# Patient Record
Sex: FEMALE | Race: ASIAN | NOT HISPANIC OR LATINO | Employment: PART TIME | URBAN - METROPOLITAN AREA
[De-identification: names, ages, dates, MRNs, and addresses within clinical notes are randomized per-mention and may not be internally consistent; named-entity substitution may affect disease eponyms.]

---

## 2018-09-27 ENCOUNTER — OFFICE VISIT - HEALTHEAST (OUTPATIENT)
Dept: FAMILY MEDICINE | Facility: CLINIC | Age: 33
End: 2018-09-27

## 2018-09-27 ENCOUNTER — COMMUNICATION - HEALTHEAST (OUTPATIENT)
Dept: SCHEDULING | Facility: CLINIC | Age: 33
End: 2018-09-27

## 2018-09-27 ENCOUNTER — COMMUNICATION - HEALTHEAST (OUTPATIENT)
Dept: TELEHEALTH | Facility: CLINIC | Age: 33
End: 2018-09-27

## 2018-09-27 DIAGNOSIS — E11.9 DIABETES (H): ICD-10-CM

## 2018-09-27 DIAGNOSIS — M54.2 NECK PAIN ON RIGHT SIDE: ICD-10-CM

## 2018-09-27 LAB
BASOPHILS # BLD AUTO: 0.1 THOU/UL (ref 0–0.2)
BASOPHILS NFR BLD AUTO: 1 % (ref 0–2)
C REACTIVE PROTEIN LHE: 0.9 MG/DL (ref 0–0.8)
EOSINOPHIL # BLD AUTO: 0.2 THOU/UL (ref 0–0.4)
EOSINOPHIL NFR BLD AUTO: 2 % (ref 0–6)
ERYTHROCYTE [DISTWIDTH] IN BLOOD BY AUTOMATED COUNT: 12.4 % (ref 11–14.5)
ERYTHROCYTE [SEDIMENTATION RATE] IN BLOOD BY WESTERGREN METHOD: 35 MM/HR (ref 0–20)
HBA1C MFR BLD: 7.7 % (ref 3.5–6)
HCT VFR BLD AUTO: 40.7 % (ref 35–47)
HGB BLD-MCNC: 14 G/DL (ref 12–16)
LYMPHOCYTES # BLD AUTO: 2.9 THOU/UL (ref 0.8–4.4)
LYMPHOCYTES NFR BLD AUTO: 37 % (ref 20–40)
MCH RBC QN AUTO: 29.4 PG (ref 27–34)
MCHC RBC AUTO-ENTMCNC: 34.3 G/DL (ref 32–36)
MCV RBC AUTO: 86 FL (ref 80–100)
MONOCYTES # BLD AUTO: 0.7 THOU/UL (ref 0–0.9)
MONOCYTES NFR BLD AUTO: 9 % (ref 2–10)
NEUTROPHILS # BLD AUTO: 3.9 THOU/UL (ref 2–7.7)
NEUTROPHILS NFR BLD AUTO: 51 % (ref 50–70)
PLATELET # BLD AUTO: 344 THOU/UL (ref 140–440)
PMV BLD AUTO: 6.9 FL (ref 7–10)
RBC # BLD AUTO: 4.74 MILL/UL (ref 3.8–5.4)
WBC: 7.8 THOU/UL (ref 4–11)

## 2018-10-03 ENCOUNTER — OFFICE VISIT - HEALTHEAST (OUTPATIENT)
Dept: OTOLARYNGOLOGY | Facility: CLINIC | Age: 33
End: 2018-10-03

## 2018-10-03 DIAGNOSIS — Z53.21 PROCEDURE AND TREATMENT NOT CARRIED OUT DUE TO PATIENT LEAVING PRIOR TO BEING SEEN BY HEALTH CARE PROVIDER: ICD-10-CM

## 2020-01-30 ENCOUNTER — OFFICE VISIT - HEALTHEAST (OUTPATIENT)
Dept: FAMILY MEDICINE | Facility: CLINIC | Age: 35
End: 2020-01-30

## 2020-01-30 DIAGNOSIS — J10.1 INFLUENZA B: ICD-10-CM

## 2020-01-30 DIAGNOSIS — J02.9 SORE THROAT: ICD-10-CM

## 2020-01-30 DIAGNOSIS — E11.9 TYPE 2 DIABETES MELLITUS WITHOUT COMPLICATION, WITHOUT LONG-TERM CURRENT USE OF INSULIN (H): ICD-10-CM

## 2020-01-30 LAB
DEPRECATED S PYO AG THROAT QL EIA: NORMAL
FLUAV AG SPEC QL IA: ABNORMAL
FLUBV AG SPEC QL IA: ABNORMAL

## 2020-01-31 LAB — GROUP A STREP BY PCR: NORMAL

## 2021-01-29 ENCOUNTER — COMMUNICATION - HEALTHEAST (OUTPATIENT)
Dept: FAMILY MEDICINE | Facility: CLINIC | Age: 36
End: 2021-01-29

## 2021-02-01 ENCOUNTER — AMBULATORY - HEALTHEAST (OUTPATIENT)
Dept: FAMILY MEDICINE | Facility: CLINIC | Age: 36
End: 2021-02-01

## 2021-02-01 ENCOUNTER — HOSPITAL ENCOUNTER (OUTPATIENT)
Dept: ULTRASOUND IMAGING | Facility: CLINIC | Age: 36
Discharge: HOME | End: 2021-02-01
Attending: NURSE PRACTITIONER

## 2021-02-01 ENCOUNTER — OFFICE VISIT - HEALTHEAST (OUTPATIENT)
Dept: FAMILY MEDICINE | Facility: CLINIC | Age: 36
End: 2021-02-01

## 2021-02-01 ENCOUNTER — COMMUNICATION - HEALTHEAST (OUTPATIENT)
Dept: TELEHEALTH | Facility: CLINIC | Age: 36
End: 2021-02-01

## 2021-02-01 DIAGNOSIS — R10.2 PELVIC PAIN IN FEMALE: ICD-10-CM

## 2021-02-01 DIAGNOSIS — N91.2 AMENORRHEA: ICD-10-CM

## 2021-02-01 DIAGNOSIS — M54.50 LUMBAR PAIN: ICD-10-CM

## 2021-02-01 DIAGNOSIS — Z32.01 PREGNANCY TEST POSITIVE: ICD-10-CM

## 2021-02-01 DIAGNOSIS — E11.65 TYPE 2 DIABETES MELLITUS WITH HYPERGLYCEMIA, WITHOUT LONG-TERM CURRENT USE OF INSULIN (H): ICD-10-CM

## 2021-02-01 LAB
ALBUMIN SERPL-MCNC: 3.7 G/DL (ref 3.5–5)
ALBUMIN UR-MCNC: NEGATIVE MG/DL
ALP SERPL-CCNC: 52 U/L (ref 45–120)
ALT SERPL W P-5'-P-CCNC: 23 U/L (ref 0–45)
ANION GAP SERPL CALCULATED.3IONS-SCNC: 10 MMOL/L (ref 5–18)
APPEARANCE UR: CLEAR
AST SERPL W P-5'-P-CCNC: 13 U/L (ref 0–40)
BILIRUB SERPL-MCNC: 0.3 MG/DL (ref 0–1)
BILIRUB UR QL STRIP: NEGATIVE
BUN SERPL-MCNC: 8 MG/DL (ref 8–22)
CALCIUM SERPL-MCNC: 8.9 MG/DL (ref 8.5–10.5)
CHLORIDE BLD-SCNC: 104 MMOL/L (ref 98–107)
CO2 SERPL-SCNC: 22 MMOL/L (ref 22–31)
COLOR UR AUTO: YELLOW
CREAT SERPL-MCNC: 0.62 MG/DL (ref 0.6–1.1)
CREAT UR-MCNC: 16.9 MG/DL
GFR SERPL CREATININE-BSD FRML MDRD: >60 ML/MIN/1.73M2
GLUCOSE BLD-MCNC: 123 MG/DL (ref 70–125)
GLUCOSE UR STRIP-MCNC: ABNORMAL MG/DL
HBA1C MFR BLD: 7.7 %
HCG UR QL: POSITIVE
HGB UR QL STRIP: NEGATIVE
KETONES UR STRIP-MCNC: NEGATIVE MG/DL
LEUKOCYTE ESTERASE UR QL STRIP: NEGATIVE
MICROALBUMIN UR-MCNC: <0.5 MG/DL (ref 0–1.99)
MICROALBUMIN/CREAT UR: NORMAL MG/G{CREAT}
NITRATE UR QL: NEGATIVE
PH UR STRIP: 6.5 [PH] (ref 5–8)
POTASSIUM BLD-SCNC: 3.9 MMOL/L (ref 3.5–5)
PROT SERPL-MCNC: 7.1 G/DL (ref 6–8)
SODIUM SERPL-SCNC: 136 MMOL/L (ref 136–145)
SP GR UR STRIP: 1.01 (ref 1–1.03)
UROBILINOGEN UR STRIP-ACNC: ABNORMAL

## 2021-02-01 ASSESSMENT — MIFFLIN-ST. JEOR: SCORE: 1303.68

## 2021-02-02 ENCOUNTER — AMBULATORY - HEALTHEAST (OUTPATIENT)
Dept: FAMILY MEDICINE | Facility: CLINIC | Age: 36
End: 2021-02-02

## 2021-02-02 ENCOUNTER — COMMUNICATION - HEALTHEAST (OUTPATIENT)
Dept: FAMILY MEDICINE | Facility: CLINIC | Age: 36
End: 2021-02-02

## 2021-02-02 DIAGNOSIS — N91.2 AMENORRHEA: ICD-10-CM

## 2021-02-02 DIAGNOSIS — Z32.01 PREGNANCY TEST POSITIVE: ICD-10-CM

## 2021-02-17 ENCOUNTER — HOSPITAL ENCOUNTER (OUTPATIENT)
Dept: ULTRASOUND IMAGING | Facility: CLINIC | Age: 36
Discharge: HOME | End: 2021-02-17
Attending: NURSE PRACTITIONER

## 2021-02-17 DIAGNOSIS — Z32.01 PREGNANCY TEST POSITIVE: ICD-10-CM

## 2021-02-17 DIAGNOSIS — N91.2 AMENORRHEA: ICD-10-CM

## 2021-02-18 ENCOUNTER — COMMUNICATION - HEALTHEAST (OUTPATIENT)
Dept: FAMILY MEDICINE | Facility: CLINIC | Age: 36
End: 2021-02-18

## 2021-03-08 ENCOUNTER — PRENATAL OFFICE VISIT - HEALTHEAST (OUTPATIENT)
Dept: FAMILY MEDICINE | Facility: CLINIC | Age: 36
End: 2021-03-08

## 2021-03-08 ENCOUNTER — RECORDS - HEALTHEAST (OUTPATIENT)
Dept: ADMINISTRATIVE | Facility: OTHER | Age: 36
End: 2021-03-08

## 2021-03-08 DIAGNOSIS — E08.00 DIABETES MELLITUS DUE TO UNDERLYING CONDITION WITH HYPEROSMOLARITY WITHOUT COMA, WITH LONG-TERM CURRENT USE OF INSULIN (H): ICD-10-CM

## 2021-03-08 DIAGNOSIS — E11.65 TYPE 2 DIABETES MELLITUS WITH HYPERGLYCEMIA, WITHOUT LONG-TERM CURRENT USE OF INSULIN (H): ICD-10-CM

## 2021-03-08 DIAGNOSIS — Z79.4 DIABETES MELLITUS DUE TO UNDERLYING CONDITION WITH HYPEROSMOLARITY WITHOUT COMA, WITH LONG-TERM CURRENT USE OF INSULIN (H): ICD-10-CM

## 2021-03-08 DIAGNOSIS — Z34.90 PREGNANCY, UNSPECIFIED GESTATIONAL AGE: ICD-10-CM

## 2021-03-08 LAB
BASOPHILS # BLD AUTO: 0.1 THOU/UL (ref 0–0.2)
BASOPHILS NFR BLD AUTO: 1 % (ref 0–2)
EOSINOPHIL # BLD AUTO: 0.1 THOU/UL (ref 0–0.4)
EOSINOPHIL NFR BLD AUTO: 1 % (ref 0–6)
ERYTHROCYTE [DISTWIDTH] IN BLOOD BY AUTOMATED COUNT: 12.2 % (ref 11–14.5)
HCT VFR BLD AUTO: 38 % (ref 35–47)
HGB BLD-MCNC: 12.6 G/DL (ref 12–16)
HIV 1+2 AB+HIV1 P24 AG SERPL QL IA: NEGATIVE
IMM GRANULOCYTES # BLD: 0 THOU/UL
IMM GRANULOCYTES NFR BLD: 0 %
LYMPHOCYTES # BLD AUTO: 2.9 THOU/UL (ref 0.8–4.4)
LYMPHOCYTES NFR BLD AUTO: 34 % (ref 20–40)
MCH RBC QN AUTO: 29.2 PG (ref 27–34)
MCHC RBC AUTO-ENTMCNC: 33.2 G/DL (ref 32–36)
MCV RBC AUTO: 88 FL (ref 80–100)
MONOCYTES # BLD AUTO: 0.8 THOU/UL (ref 0–0.9)
MONOCYTES NFR BLD AUTO: 10 % (ref 2–10)
NEUTROPHILS # BLD AUTO: 4.7 THOU/UL (ref 2–7.7)
NEUTROPHILS NFR BLD AUTO: 55 % (ref 50–70)
PLATELET # BLD AUTO: 314 THOU/UL (ref 140–440)
PMV BLD AUTO: 9.7 FL (ref 8.5–12.5)
RBC # BLD AUTO: 4.31 MILL/UL (ref 3.8–5.4)
TSH SERPL DL<=0.005 MIU/L-ACNC: 2.12 UIU/ML (ref 0.3–5)
WBC: 8.6 THOU/UL (ref 4–11)

## 2021-03-08 RX ORDER — INSULIN GLARGINE 100 [IU]/ML
10 INJECTION, SOLUTION SUBCUTANEOUS DAILY
Status: SHIPPED | COMMUNITY
Start: 2021-02-11 | End: 2021-08-05

## 2021-03-08 ASSESSMENT — MIFFLIN-ST. JEOR: SCORE: 1287.52

## 2021-03-09 LAB
ABO/RH(D): NORMAL
ABORH REPEAT: NORMAL
ANTIBODY SCREEN: NEGATIVE
C TRACH DNA SPEC QL PROBE+SIG AMP: NEGATIVE
HBV SURFACE AG SERPL QL IA: NEGATIVE
HPV SOURCE: NORMAL
HUMAN PAPILLOMA VIRUS 16 DNA: NEGATIVE
HUMAN PAPILLOMA VIRUS 18 DNA: NEGATIVE
HUMAN PAPILLOMA VIRUS FINAL DIAGNOSIS: NORMAL
HUMAN PAPILLOMA VIRUS OTHER HR: NEGATIVE
N GONORRHOEA DNA SPEC QL NAA+PROBE: NEGATIVE
RUBV IGG SERPL QL IA: POSITIVE
SPECIMEN DESCRIPTION: NORMAL
T PALLIDUM AB SER QL: NEGATIVE

## 2021-03-15 ENCOUNTER — COMMUNICATION - HEALTHEAST (OUTPATIENT)
Dept: FAMILY MEDICINE | Facility: CLINIC | Age: 36
End: 2021-03-15

## 2021-03-15 LAB
BKR LAB AP ABNORMAL BLEEDING: NO
BKR LAB AP BIRTH CONTROL/HORMONES: NORMAL
BKR LAB AP CERVICAL APPEARANCE: NORMAL
BKR LAB AP GYN ADEQUACY: NORMAL
BKR LAB AP GYN INTERPRETATION: NORMAL
BKR LAB AP HPV REFLEX: NORMAL
BKR LAB AP LMP: NORMAL
BKR LAB AP PATIENT STATUS: NORMAL
BKR LAB AP PREVIOUS ABNORMAL: NO
BKR LAB AP PREVIOUS NORMAL: 2011
HIGH RISK?: NO
PATH REPORT.COMMENTS IMP SPEC: NORMAL
RESULT FLAG (HE HISTORICAL CONVERSION): NORMAL

## 2021-05-04 ENCOUNTER — PRENATAL OFFICE VISIT - HEALTHEAST (OUTPATIENT)
Dept: FAMILY MEDICINE | Facility: CLINIC | Age: 36
End: 2021-05-04

## 2021-05-04 DIAGNOSIS — Z34.90 PREGNANCY, UNSPECIFIED GESTATIONAL AGE: ICD-10-CM

## 2021-05-04 DIAGNOSIS — E11.65 TYPE 2 DIABETES MELLITUS WITH HYPERGLYCEMIA, WITHOUT LONG-TERM CURRENT USE OF INSULIN (H): ICD-10-CM

## 2021-05-10 ENCOUNTER — HOSPITAL ENCOUNTER (OUTPATIENT)
Dept: ULTRASOUND IMAGING | Facility: CLINIC | Age: 36
Discharge: HOME | End: 2021-05-10
Attending: FAMILY MEDICINE

## 2021-05-14 ENCOUNTER — COMMUNICATION - HEALTHEAST (OUTPATIENT)
Dept: FAMILY MEDICINE | Facility: CLINIC | Age: 36
End: 2021-05-14

## 2021-05-27 VITALS
WEIGHT: 143.6 LBS | OXYGEN SATURATION: 98 % | DIASTOLIC BLOOD PRESSURE: 66 MMHG | SYSTOLIC BLOOD PRESSURE: 100 MMHG | HEART RATE: 84 BPM

## 2021-06-01 ENCOUNTER — RECORDS - HEALTHEAST (OUTPATIENT)
Dept: ADMINISTRATIVE | Facility: CLINIC | Age: 36
End: 2021-06-01

## 2021-06-02 VITALS — BODY MASS INDEX: 32.42 KG/M2 | WEIGHT: 166 LBS

## 2021-06-04 VITALS
HEART RATE: 88 BPM | BODY MASS INDEX: 32.03 KG/M2 | SYSTOLIC BLOOD PRESSURE: 121 MMHG | WEIGHT: 164 LBS | TEMPERATURE: 98.8 F | OXYGEN SATURATION: 98 % | DIASTOLIC BLOOD PRESSURE: 85 MMHG

## 2021-06-05 VITALS
BODY MASS INDEX: 30.18 KG/M2 | WEIGHT: 153.7 LBS | HEIGHT: 60 IN | DIASTOLIC BLOOD PRESSURE: 60 MMHG | HEART RATE: 76 BPM | SYSTOLIC BLOOD PRESSURE: 100 MMHG

## 2021-06-05 VITALS
SYSTOLIC BLOOD PRESSURE: 96 MMHG | WEIGHT: 150 LBS | DIASTOLIC BLOOD PRESSURE: 60 MMHG | OXYGEN SATURATION: 97 % | HEIGHT: 60 IN | HEART RATE: 84 BPM | BODY MASS INDEX: 29.45 KG/M2

## 2021-06-05 NOTE — PROGRESS NOTES
Walk In Care Note                                                        Date of Visit: 1/30/2020     Chief Complaint   Selena Lui is a(n) 34 y.o.  female who presents to Walk In Care with the following complaint(s):  Sore Throat (x 3 days)       Assessment and Plan   1. Influenza B  - oseltamivir (TAMIFLU) 75 MG capsule; Take 1 capsule (75 mg total) by mouth 2 (two) times a day for 5 days.  Dispense: 10 capsule; Refill: 0    2. Sore throat  - Rapid Strep A Screen-Throat swab  - Group A Strep, RNA Direct Detection, Throat  - Influenza A/B Rapid Test- Nasal Swab    3. Type 2 diabetes mellitus without complication, without long-term current use of insulin (H)      Treating influenza with oseltamivir as listed above since patient is considered high risk for influenza-related complications due to her diabetes. Discussed potential benefits and side effects of this medication with the patient. Discussed symptomatic / supportive cares, including rest, hydration, and use of alternating doses of acetaminophen and ibuprofen to manage fever and discomfort. Strep screen is negative. Reflex strep testing is in process; will prescribe amoxicillin if positive.     Patient does have underlying type 2 diabetes mellitus, currently untreated.  She reports that she has not taken metformin for over a year.  Last recorded hemoglobin A1c was 7.7% on 9/27/2018.    Counseled patient regarding assessment and plan for evaluation and treatment. Questions were answered. See AVS for the specific written instructions and educational handout(s) regarding influenza that were provided at the conclusion of the visit.     Discussed signs / symptoms that warrant urgent / emergent medical attention.     Follow up within 4 days if symptoms do not improve.  Instructed patient to reestablish with Primary Care at her earliest convenience for management of her type 2 diabetes.     History of Present Illness   Primary symptom: Sore throat  Onset: 2 days  ago  Progression: Persisting  Hoarseness: Yes  Dysphagia: Yes  Fevers: Initially, Tmax 103 F  Chills: Yes  Upper respiratory symptoms: Has nasal congestion, rhinorrhea, and mild cough starting today.   Associated headache: Yes  Associated rash: No  Associated abdominal pain: No abdominal pain. Vomited once last night.   Additional symptoms: Lightheaded  Home therapies utilized: Tylenol Cold & Flu  History of recurrent strep: No  Exposure to strep: No     Review of Systems   Review of Systems   All other systems reviewed and are negative.       Physical Exam   Vitals:    01/30/20 0939   BP: 121/85   Patient Site: Right Arm   Patient Position: Sitting   Cuff Size: Adult Regular   Pulse: 88   Temp: 98.8  F (37.1  C)   TempSrc: Oral   SpO2: 98%   Weight: 164 lb (74.4 kg)     Physical Exam  Vitals signs and nursing note reviewed.   Constitutional:       General: She is not in acute distress.     Appearance: She is well-developed and normal weight. She is not ill-appearing or toxic-appearing.   HENT:      Head: Normocephalic and atraumatic.      Right Ear: Tympanic membrane, ear canal and external ear normal.      Left Ear: Tympanic membrane, ear canal and external ear normal.      Nose: Mucosal edema present. No rhinorrhea.      Mouth/Throat:      Mouth: Mucous membranes are moist. No oral lesions.      Pharynx: Uvula midline. Posterior oropharyngeal erythema present. No oropharyngeal exudate.      Tonsils: No tonsillar exudate. 1+ on the right. 1+ on the left.   Eyes:      General: Lids are normal.      Conjunctiva/sclera: Conjunctivae normal.   Neck:      Musculoskeletal: Neck supple. No edema or erythema.   Cardiovascular:      Rate and Rhythm: Normal rate and regular rhythm.      Heart sounds: S1 normal and S2 normal. No murmur. No friction rub. No gallop.    Pulmonary:      Effort: Pulmonary effort is normal.      Breath sounds: Normal breath sounds. No stridor. No wheezing, rhonchi or rales.   Lymphadenopathy:       Cervical: No cervical adenopathy.   Skin:     General: Skin is warm and dry.      Coloration: Skin is not pale.      Findings: No rash.   Neurological:      General: No focal deficit present.      Mental Status: She is alert and oriented to person, place, and time.          Diagnostic Studies   Laboratory:  Results for orders placed or performed in visit on 01/30/20   Rapid Strep A Screen-Throat swab   Result Value Ref Range    Rapid Strep A Antigen No Group A Strep detected, presumptive negative No Group A Strep detected, presumptive negative   Influenza A/B Rapid Test- Nasal Swab   Result Value Ref Range    Influenza  A, Rapid Antigen No Influenza A antigen detected No Influenza A antigen detected    Influenza B, Rapid Antigen Influenza B antigen detected (!) No Influenza B antigen detected     Radiology:  N/A  Electrocardiogram:  N/A     Procedure Note   N/A     Pertinent History   The following portions of the patient's history were reviewed and updated as appropriate: allergies, current medications, past family history, past medical history, past social history, past surgical history and problem list.    Patient has Tears of meniscus and ACL of left knee and Diabetes (H) on their problem list.    Patient has a past medical history of Diabetes (H) (10/18/2016) and Tears of meniscus and ACL of left knee (10/18/2016).    Patient has a past surgical history that includes Anterior cruciate ligament repair (Left).    Patient's family history includes No Medical Problems in her father and mother.    Patient reports that she has never smoked. She has never used smokeless tobacco. She reports that she does not drink alcohol or use drugs.     Portions of this note have been dictated using voice recognition software. Any grammatical or contextual distortions are unintentional and inherent to the software.    Titus Feliciano MD  [facility]

## 2021-06-07 ENCOUNTER — PRENATAL OFFICE VISIT - HEALTHEAST (OUTPATIENT)
Dept: FAMILY MEDICINE | Facility: CLINIC | Age: 36
End: 2021-06-07

## 2021-06-07 DIAGNOSIS — Z34.90 PREGNANCY, UNSPECIFIED GESTATIONAL AGE: ICD-10-CM

## 2021-06-09 ENCOUNTER — RECORDS - HEALTHEAST (OUTPATIENT)
Dept: ADMINISTRATIVE | Facility: OTHER | Age: 36
End: 2021-06-09

## 2021-06-09 LAB — HBA1C MFR BLD: 5.8 % (ref 4–6)

## 2021-06-14 ENCOUNTER — RECORDS - HEALTHEAST (OUTPATIENT)
Dept: HEALTH INFORMATION MANAGEMENT | Facility: CLINIC | Age: 36
End: 2021-06-14

## 2021-06-14 NOTE — TELEPHONE ENCOUNTER
Called and notified patient of the below message. Patient was transferred to radiology to arrange a 2 week follow-up US.

## 2021-06-14 NOTE — PROGRESS NOTES
Assessment and Plan:   1. Amenorrhea  Pregnancy, Urine    US OB < 14 Weeks With Transvaginal    CANCELED: US OB < 14 Weeks With Transvaginal   2. Pregnancy test positive  Ambulatory referral to Endocrinology    US OB < 14 Weeks With Transvaginal    CANCELED: US OB < 14 Weeks With Transvaginal   3. Pelvic pain in female  Urinalysis-UC if Indicated    US OB < 14 Weeks With Transvaginal   4. Lumbar pain  Urinalysis-UC if Indicated    US OB < 14 Weeks With Transvaginal   5. Type 2 diabetes mellitus with hyperglycemia, without long-term current use of insulin (H)  Glycosylated Hemoglobin A1c    Comprehensive Metabolic Panel    Microalbumin, Random Urine    Ambulatory referral to Endocrinology     According to her last menstrual period, patient is currently 6 weeks, 5 days gestation with an estimated due date of 9/23/2021.  Will obtain OB ultrasound to date the pregnancy and rule out an ectopic pregnancy due to a bout of pelvic pain and lumbar pain last week.  We will also rule out a urinary tract infection.  Patient will continue taking her prenatal vitamin with DHA.  Discussed avoidance of alcohol, smoking, drugs.  She is to avoid contact sports to the abdomen.  Provided OB handouts.  Patient plans on establishing OB care with Dr. Rivas.  Patient has a history of diabetes and has not been taking medication for this.  Will check A1c today.  Will refer to endocrinology for further evaluation and management during her pregnancy.  Patient is content with the plan.      Subjective:     Selena is a 35 y.o. female presenting to the clinic for a pregnancy confirmation.  Patient's last menstrual period was on 12/16/2020.  She has been traditionally  for 15 years.  She has 2 children ages 8 and 15.  They were born vaginally without complications.  Patient states this was not a planned pregnancy.  She had a positive at home pregnancy test 2 weeks ago.  She has had nausea, vomiting, breast tenderness.  She is taking a  prenatal multivitamin with DHA.  Patient states last week she experienced pain within her right lower lumbar and pelvic regions after urinating.  Pain lasted for 20 minutes and was a constant ache.  She has not had any vaginal bleeding or spotting.  Patient has type 2 diabetes and is not currently taking medication.  Patient was diagnosed after her second pregnancy 8 years ago.  She does not check her blood sugars.  Last A1c was 7.7% on 9/27/2018.    Review of Systems: A complete 14 point review of systems was obtained and is negative or as stated in the history of present illness.    Social History     Socioeconomic History     Marital status: Single     Spouse name: Not on file     Number of children: Not on file     Years of education: Not on file     Highest education level: Not on file   Occupational History     Not on file   Social Needs     Financial resource strain: Not on file     Food insecurity     Worry: Not on file     Inability: Not on file     Transportation needs     Medical: Not on file     Non-medical: Not on file   Tobacco Use     Smoking status: Never Smoker     Smokeless tobacco: Never Used   Substance and Sexual Activity     Alcohol use: No     Drug use: No     Sexual activity: Yes     Birth control/protection: I.U.D.     Comment: paragard   Lifestyle     Physical activity     Days per week: Not on file     Minutes per session: Not on file     Stress: Not on file   Relationships     Social connections     Talks on phone: Not on file     Gets together: Not on file     Attends [employer] service: Not on file     Active member of club or organization: Not on file     Attends meetings of clubs or organizations: Not on file     Relationship status: Not on file     Intimate partner violence     Fear of current or ex partner: Not on file     Emotionally abused: Not on file     Physically abused: Not on file     Forced sexual activity: Not on file   Other Topics Concern     Not on file   Social History  Narrative     Not on file       Active Ambulatory Problems     Diagnosis Date Noted     Tears of meniscus and ACL of left knee 10/18/2016     Diabetes (H) 10/18/2016     Resolved Ambulatory Problems     Diagnosis Date Noted     Ankle Joint Pain      No Additional Past Medical History       Family History   Problem Relation Age of Onset     No Medical Problems Mother      No Medical Problems Father        Objective:     /60 (Patient Site: Left Arm, Patient Position: Sitting, Cuff Size: Adult Regular)   Pulse 76   Ht 5' (1.524 m)   Wt 153 lb 11.2 oz (69.7 kg)   BMI 30.02 kg/m      Patient is alert, in no obvious distress.   Skin: Warm, dry.    Lungs:  Clear to auscultation. Respirations even and unlabored.  No wheezing or rales noted.   Heart:  Regular rate and rhythm.  No murmurs, S3, S4, gallops, or rubs.    Abdomen: Soft, nontender.  No organomegaly. Bowel sounds normoactive. No guarding or masses noted.      Urine pregnancy test ordered and is positive.

## 2021-06-14 NOTE — TELEPHONE ENCOUNTER
New Appointment Needed  What is the reason for the visit:    Pregnancy Confirmation Appt Needed  When was the first day of your last menstrual cycle?: 12/16/20  Have you done a home pregnancy test?: Yes: yes about 6wks pregnant.    Provider Preference: Any available  How soon do you need to be seen?: tomorrow  Waitlist offered?: No  Okay to leave a detailed message:  No

## 2021-06-14 NOTE — TELEPHONE ENCOUNTER
Yes, she is okay to see the [facility] [place].  Secondly, please notify her that her ultrasound showed her pregnancy at 5 weeks, 6 days gestation with an estimated due date of 9/28/21.  I would like to repeat the ultrasound in 2 weeks due to her being so early in the pregnancy.  I will place the order.  Thanks.

## 2021-06-14 NOTE — TELEPHONE ENCOUNTER
Pt would like this message transferred to the [facility]     MARIA LUZ Granados  1:48 PM ........ 1/29/2021

## 2021-06-14 NOTE — TELEPHONE ENCOUNTER
----- Message from Flora Ramirez CNP sent at 2/1/2021  9:00 PM CST -----  Please notify the patient that her A1C is elevated.  I spoke with Dr. Rivas who would like to start Metformin 500 mg twice daily.  I also placed a referral to diabetes education and Endocrinology to discuss dietary changes and medication management.  Thanks.

## 2021-06-14 NOTE — TELEPHONE ENCOUNTER
Left message to call back for: pt  Information to relay to patient:  Left message to call and schedule preg confirmation with Flora in Monday.

## 2021-06-14 NOTE — TELEPHONE ENCOUNTER
Reason for Call:  Call back     Detailed comments: recvd call from pt/Selena, you recommended her to meet with Endo and Diabetes Ed.    Selena is scheduled to see [facility] [place] this coming Thursday, 02.04.2021    F Diabetes Ed called Selena and stated that since she is scheduled with [facility] [place] they would not schedule her with [facility].    Selena would like to know if you are ok with her just seeing [facility] [place] [facility] right now and go from there once she meets with them this week.    Phone Number Patient can be reached at: Home number on file [phone] (home)    Best Time: anytime    Can we leave a detailed message on this number?: Yes    Call taken on 2/2/2021 at 10:32 AM by Danay Richards

## 2021-06-15 NOTE — TELEPHONE ENCOUNTER
Reason for Call:  Request for results:    Name of test or procedure: Blood Test for Gender of baby    Date of test of procedure: 03/08/21    Location of the test or procedure: [facility] to leave the result message on voice mail or with a family member? Yes    Phone number Patient can be reached at:   Cell number on file:    Telephone Information:   Mobile [phone]       Additional comments: Wanting to know the results of the Blood test.     Call taken on 3/15/2021 at 9:54 AM by Laverne Hassan

## 2021-06-15 NOTE — TELEPHONE ENCOUNTER
Reason contacted:  Results   Information relayed:  Below message relayed to patient. First OB scheduled with Dr. Rivas.   Additional questions:  No  Further follow-up needed:  No  Okay to leave a detailed message:  No

## 2021-06-15 NOTE — TELEPHONE ENCOUNTER
----- Message from Flora Ramirez CNP sent at 2/18/2021  7:21 AM CST -----  Please notify the patient that her ultrasound shows that she is 7 weeks, 6 days gestation with an estimated due date of 9/28/21.  Please assist her with scheduling her first ob with Dr. Rivas.  Thanks.

## 2021-06-15 NOTE — TELEPHONE ENCOUNTER
Please let pt know those results are not back yet.  They will be back later this week or early next week. It can take up to 2 weeks to get results

## 2021-06-15 NOTE — PROGRESS NOTES
PRENATAL VISIT   FIRST OBSTETRICAL EXAM - OB    Assessment / Impression     36-year-old G3, P2 at 10 weeks and 6 days gestation with an EDC of nine 2821 x 6-week ultrasound.  She has type 2 diabetes mellitus and is seeing [facility] [place] in consultation.  Blood sugars have been mildly high and she will continue to work with them.  Basaglar insulin has been added as well as Humalog insulin to her regimen.  She elects progenitor testing today and is open to just being called with all of her results.  Plan follow-up at 16 weeks.  Normal first prenatal visit at 10w6d  Discussed orientation, general information, lifestyle, nutrition, exercise,warning signs, resources, lab testing, risk screening and discussed cystic fibrosis screening with patient.  Questions answered.    Plan:        Initial labs drawn.  Prenatal vitamins.  Problem list reviewed and updated.  Genetic screening test options discussed:  Patient elects Cell free DNA test  Role of ultrasound in pregnancy discussed; fetal survey: requested.  Follow up: No follow-ups on file.      Subjective:    Selena Lui is a 36 y.o.  here today for her First Obstetrical Exam.   OB History    Para Term  AB Living   3 2 2     1   SAB TAB Ectopic Multiple Live Births           1      # Outcome Date GA Lbr Kamar/2nd Weight Sex Delivery Anes PTL Lv   3 Current            2 Term 12 39w0d  7 lb 8 oz (3.402 kg) M Vag-Spont None N    1 Term 05 39w0d  6 lb 8 oz (2.948 kg) F Vag-Spont  N CECELIA       Expected Date of Delivery: 2021, by Ultrasound    Past Medical History:   Diagnosis Date     Diabetes (H) 10/18/2016     Tears of meniscus and ACL of left knee 10/18/2016     Past Surgical History:   Procedure Laterality Date     ANTERIOR CRUCIATE LIGAMENT REPAIR Left          Social History     Tobacco Use     Smoking status: Never Smoker     Smokeless tobacco: Never Used   Substance Use Topics     Alcohol use: No     Drug use: No  "    Current Outpatient Medications   Medication Sig Dispense Refill     metFORMIN (GLUCOPHAGE) 500 MG tablet Take 1 tablet (500 mg total) by mouth 2 (two) times a day with meals. 180 tablet 0     ACCU-CHEK GUIDE GLUCOSE METER Misc Use As Directed. as directed       ACCU-CHEK GUIDE TEST STRIPS strips USE TO TEST FOUR TIMES DAILY       ACCU-CHEK SOFTCLIX LANCETS lancets USE TO TEST FOUR TIMES DAILY       BD BONNIE 2ND GEN PEN NEEDLE 32 gauge x 5/32\" Ndle USE TO TEST FOUR TIMES DAILY       insulin lispro (HUMALOG KWIKPEN) 100 unit/mL pen INJECT 2 UNITS UNDER THE SKIN WITH MEALS       LANTUS SOLOSTAR U-100 INSULIN 100 unit/mL (3 mL) pen Inject 8 Units under the skin daily.       TRUEPLUS KETONE Strp USE ONE AS DIRECTED       No current facility-administered medications for this visit.      No Known Allergies          High Risk Behavior: Age is <18 or >35    Review of Systems  General:  Denies problem  Eyes: Denies problem  Ears/Nose/Throat: Denies problem  Cardiovascular: Denies problem  Respiratory:  Denies problem  Gastrointestinal:  Denies problem, Genitourinary: Denies problem  Musculoskeletal:  Denies problem  Skin: Denies problem  Neurologic: Denies problem  Psychiatric: Denies problem  Endocrine: Denies problem  Heme/Lymphatic: Denies problem   Allergic/Immunologic: Denies problem       Objective:   Objective    Vitals:    03/08/21 1300   BP: 96/60   Pulse: 84   SpO2: 97%   Weight: 150 lb (68 kg)   Height: 5' 0.04\" (1.525 m)     Physical Exam:  General Appearance: Alert, cooperative, no distress, appears stated age  Head: Normocephalic, without obvious abnormality, atraumatic  Eyes: PERRL, conjunctiva/corneas clear, EOM's intact  Ears: Normal TM's and external ear canals, both ears  Nose: Nares normal, septum midline,mucosa normal, no drainage  Throat: Lips, mucosa, and tongue normal; teeth and gums normal  Neck: Supple, symmetrical, trachea midline, no adenopathy;  thyroid: not enlarged, symmetric, no " tenderness/mass/nodules; no carotid bruit or JVD  Back: Symmetric, no curvature, ROM normal, no CVA tenderness  Lungs: Clear to auscultation bilaterally, respirations unlabored  Breasts: No breast masses, tenderness, asymmetry, or nipple discharge.  Heart: Regular rate and rhythm, S1 and S2 normal, no murmur, rub, or gallop, Abdomen: Soft, non-tender, bowel sounds active all four quadrants,  no masses, no organomegaly  Pelvic:Normally developed genitalia with no external lesions or eruptions. Vagina and cervix show no lesions, inflammation, discharge or tenderness. No cystocele, No rectocele. Uterus normal.  No adnexal mass or tenderness.    Extremities: Extremities normal, atraumatic, no cyanosis or edema  Skin: Skin color, texture, turgor normal, no rashes or lesions  Lymph nodes: Cervical, supraclavicular, and axillary nodes normal  Neurologic: Normal     Lab:   Results for orders placed or performed in visit on 02/01/21   Pregnancy, Urine   Result Value Ref Range    Pregnancy Test, Urine Positive (!) Negative   Glycosylated Hemoglobin A1c   Result Value Ref Range    Hemoglobin A1c 7.7 (H) <=5.6 %   Comprehensive Metabolic Panel   Result Value Ref Range    Sodium 136 136 - 145 mmol/L    Potassium 3.9 3.5 - 5.0 mmol/L    Chloride 104 98 - 107 mmol/L    CO2 22 22 - 31 mmol/L    Anion Gap, Calculation 10 5 - 18 mmol/L    Glucose 123 70 - 125 mg/dL    BUN 8 8 - 22 mg/dL    Creatinine 0.62 0.60 - 1.10 mg/dL    GFR MDRD Af Amer >60 >60 mL/min/1.73m2    GFR MDRD Non Af Amer >60 >60 mL/min/1.73m2    Bilirubin, Total 0.3 0.0 - 1.0 mg/dL    Calcium 8.9 8.5 - 10.5 mg/dL    Protein, Total 7.1 6.0 - 8.0 g/dL    Albumin 3.7 3.5 - 5.0 g/dL    Alkaline Phosphatase 52 45 - 120 U/L    AST 13 0 - 40 U/L    ALT 23 0 - 45 U/L   Microalbumin, Random Urine   Result Value Ref Range    Microalbumin, Random Urine <0.50 0.00 - 1.99 mg/dL    Creatinine, Urine 16.9 mg/dL    Microalbumin/Creatinine Ratio Random Urine     Urinalysis-UC if  Indicated   Result Value Ref Range    Color, UA Yellow Colorless, Yellow, Straw, Light Yellow    Clarity, UA Clear Clear    Glucose,  mg/dL (!) Negative    Bilirubin, UA Negative Negative    Ketones, UA Negative Negative    Specific Gravity, UA 1.010 1.005 - 1.030    Blood, UA Negative Negative    pH, UA 6.5 5.0 - 8.0    Protein, UA Negative Negative mg/dL    Urobilinogen, UA 0.2 E.U./dL 0.2 E.U./dL, 1.0 E.U./dL    Nitrite, UA Negative Negative    Leukocytes, UA Negative Negative

## 2021-06-15 NOTE — TELEPHONE ENCOUNTER
I called pt and we spoke. I relayed the message below from Dr. Rivas. Pt understands and we will keep her posted.

## 2021-06-15 NOTE — TELEPHONE ENCOUNTER
Left message to call back for: results   Information to relay to patient:  Below message.    Please help schedule a first OB with Dr. Rivas between 3/5/2021 and 3/19/2021

## 2021-06-17 NOTE — PROGRESS NOTES
36-year-old G3, P2 at 19 weeks gestation, a positive blood type.  Pregnancy is complicated by pre-existing type 2 diabetes.  She is continue on Metformin and following with the endocrine clinic of [place].  They have added Lantus and mealtime insulin.  She still having some occasional higher blood sugars.  She will work with them and adjusting her insulin levels.  She is having good fetal movement.  No cramping or bleeding.  We reviewed her normal progenity  Results.  She is referred for a fetal survey.  She has lost weight as opposed to gaining weight.  She attributes this to eating healthier now that she is pregnant.  We will assess fetal growth on this ultrasound.  Follow-up in 1 month.

## 2021-06-17 NOTE — TELEPHONE ENCOUNTER
----- Message from Edwige Rivas MD sent at 2021 12:31 PM CDT -----  The radiologist added a note to her ultrasound result which shows the cord is inserted toward the edge of the placenta call marginal cord insertion.  The is not a problem typically however can result in  delivery or growth restriction. We will be doing regular ultrasounds starting at 28 weeks to monitor this.

## 2021-06-18 NOTE — PATIENT INSTRUCTIONS - HE
Patient Instructions by Titus Feliciano MD at 1/30/2020  9:30 AM     Author: Titus Feliciano MD Service: -- Author Type: Physician    Filed: 1/30/2020 11:02 AM Encounter Date: 1/30/2020 Status: Addendum    : Titus Feliciano MD (Physician)    Related Notes: Original Note by Titus Feliciano MD (Physician) filed at 1/30/2020 10:58 AM       -Rapid strep test is negative.  A confirmatory strep test is in process and will be finalized tomorrow.  -We will only reach out to you if the confirmatory strep test is positive.  An antibiotic will be prescribed if this test is positive.  -You have tested positive for Influenza B.  -Complete the full course of Tamiflu as directed.  -Recommend rest, hydration, and alternating doses of over the counter acetaminophen and ibuprofen as needed to manage fever and discomfort.  -Recommend that you reestablish with Primary Care at your earliest convenience to manage your diabetes.  Patient Education     Influenza (Adult)    Influenza is also called the flu. It is a viral illness that affects the air passages of your lungs. It is different from the common cold. The flu can easily be passed from one to person to another. It may be spread through the air by coughing and sneezing. Or it can be spread by touching the sick person and then touching your own eyes, nose, or mouth.  The flu starts 1 to 3 days after you are exposed to the flu virus. It may last for 1 to 2 weeks but many people feel tired or fatigued for many weeks afterward. You usually dont need to take antibiotics unless you have a complication. This might be an ear or sinus infection or pneumonia.  Symptoms of the flu may be mild or severe. They can include extreme tiredness (wanting to stay in bed all day), chills, fevers, muscle aches, soreness with eye movement, headache, and a dry, hacking cough.  Home care  Follow these guidelines when caring for yourself at home:    Avoid being around cigarette smoke,  whether yours or other peoples.    Acetaminophen or ibuprofen will help ease your fever, muscle aches, and headache. Dont give aspirin to anyone younger than 18 who has the flu. Aspirin can harm the liver.    Nausea and loss of appetite are common with the flu. Eat light meals. Drink 6 to 8 glasses of liquids every day. Good choices are water, sport drinks, soft drinks without caffeine, juices, tea, and soup. Extra fluids will also help loosen secretions in your nose and lungs.    Over-the-counter cold medicines will not make the flu go away faster. But the medicines may help with coughing, sore throat, and congestion in your nose and sinuses. Dont use a decongestant if you have high blood pressure.    Stay home until your fever has been gone for at least 24 hours without using medicine to reduce fever.  Follow-up care  Follow up with your healthcare provider, or as advised, if you are not getting better over the next week.  If you are age 65 or older, talk with your provider about getting a pneumococcal vaccine every 5 years. You should also get this vaccine if you have chronic asthma or COPD. All adults should get a flu vaccine every fall. Ask your provider about this.  When to seek medical advice  Call your healthcare provider right away if any of these occur:    Cough with lots of colored mucus (sputum) or blood in your mucus    Chest pain, shortness of breath, wheezing, or trouble breathing    Severe headache, or face, neck, or ear pain    New rash with fever    Fever of 100.4 F (38 C) or higher, or as directed by your healthcare provider    Confusion, behavior change, or seizure    Severe weakness or dizziness    You get a new fever or cough after getting better for a few days  Date Last Reviewed: 1/1/2017 2000-2017 [employer]. [address] [place] [address], [place] [address]. All rights reserved. This information is not intended as a substitute for professional medical care. Always follow your  healthcare professional's instructions.

## 2021-06-20 NOTE — LETTER
Letter by Titus Feliciano MD at      Author: Titus Feliciano MD Service: -- Author Type: --    Filed:  Encounter Date: 1/30/2020 Status: (Other)         January 30, 2020     Patient: Selena Lui   YOB: 1985   Date of Visit: 1/30/2020       To Whom it May Concern:    Selena Lui was seen in my clinic on 1/30/2020.  Please excuse her absence from work on 1/29/2020 and 1/30/2020 due to illness.    If you have any questions or concerns, please don't hesitate to call.    Sincerely,         Electronically signed by Titus Feliciano MD

## 2021-06-20 NOTE — PROGRESS NOTES
ASSESSMENT/PLAN:  1. Diabetes (H)  Type 2 diabetes, currently diet controlled.  Recheck a hemoglobin A1c today and it is elevated at 7.8.  I am going to recommend that she restart her metformin and follow-up in 3 months for recheck.  She is given a refill of her lancets and testing strips so she can more closely track her blood sugars.  - Glycosylated Hemoglobin A1c    2. Neck pain on right side  Primary reason for visit today with some pain in the right side of her neck.  Exam is unremarkable.  To evaluate for possible infection we will check a heme 1 sed rate and CRP.  I do not find an etiology for this pain and do refer to ENT for further recommendations.  - Ambulatory referral to ENT  - HM1(CBC and Differential)  - Sedimentation Rate  - C-Reactive Protein(CRP)  - HM1 (CBC with Diff)      Patient Instructions   Referral is placed for ENT, schedule this consult at your earliest convenience.       Orders Placed This Encounter   Procedures     Glycosylated Hemoglobin A1c     Sedimentation Rate     C-Reactive Protein(CRP)     HM1 (CBC with Diff)     Ambulatory referral to ENT     Referral Priority:   Routine     Referral Type:   Consultation     Referral Reason:   Evaluation and Treatment     Requested Specialty:   Otolaryngology     Number of Visits Requested:   1     Medications Discontinued During This Encounter   Medication Reason     blood glucose test (CONTOUR NEXT STRIPS) strips Therapy completed     metFORMIN (GLUCOPHAGE-XR) 750 MG 24 hr tablet Therapy completed     generic lancets (MICROLET LANCET) Therapy completed       Return if symptoms worsen or fail to improve.    CHIEF COMPLAINT;  Chief Complaint   Patient presents with     Neck Pain     sore area on right times 3 weeks       HISTORY OF PRESENT ILLNESS:  Selena is a 33 y.o. female presenting to the clinic today for evaluation of neck pain.    Neck Pain: She endorses a deep tenderness on the right side of her neck that has been present for 3 weeks. This  is getting more painful for her and is quite tender to the touch. She sometimes feels the discomfort when she stretches or moves her neck, but otherwise feels it mostly when she touches the area. She also notices the discomfort when she takes bigger bites of food. She still has her tonsils and has not had previous problems with her tonsils. She denies fever, chills, recent illness, coughing with eating, or dysphagia. She does not feel that her voice has been more hoarse secondary to her neck pain.    Diabetes: Her most recent hemoglobin A1c was 7.1% 10/18/16. She has not been monitoring her blood sugars at home recently as she run out of her blood glucose test strips and does request a refill of these. She was previously checking her blood sugars once every two days.     REVIEW OF SYSTEMS:  All other systems are negative.    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 Reviewed, as below.    TOBACCO USE:  History   Smoking Status     Never Smoker   Smokeless Tobacco     Never Used       VITALS:  Vitals:    09/27/18 1014   BP: 122/70   Pulse: 72   Resp: 16   Weight: 166 lb (75.3 kg)     Wt Readings from Last 3 Encounters:   09/27/18 166 lb (75.3 kg)   11/26/16 160 lb (72.6 kg)   10/18/16 167 lb (75.8 kg)     Body mass index is 32.42 kg/(m^2).    PHYSICAL EXAM:  GENERAL APPEARANCE: Alert, cooperative, no distress, appears stated age  HEAD: Normocephalic, without obvious abnormality, atraumatic  EYES:  PERRL, conjunctiva/corneas clear, EOM's intact, fundi     benign, both eyes       EARS: Normal TM's and external ear canals, both ears  NOSE:  Nares normal, septum midline, mucosa normal, no drainage or sinus tenderness  THROAT: Lips, mucosa, and tongue normal; teeth and gums normal; posterior oropharynx slightly erythematous on right, no tonsillar hypertrophy or exudate  NECK: Supple, symmetrical, trachea midline, no adenopathy; tender along right neck along sternocleidomastoid, no masses, no  lymphadenopathy    thyroid:  No enlargement/tenderness/nodules  LUNGS: Clear to auscultation bilaterally, respirations unlabored  HEART: Regular rate and rhythm, S1 and S2 normal, no murmur, rub or gallop  NEUROLOGIC: CNII-XII intact.     RECENT RESULTS  Recent Results (from the past 48 hour(s))   Glycosylated Hemoglobin A1c    Collection Time: 09/27/18 10:42 AM   Result Value Ref Range    Hemoglobin A1c 7.7 (H) 3.5 - 6.0 %   HM1 (CBC with Diff)    Collection Time: 09/27/18 10:42 AM   Result Value Ref Range    WBC 7.8 4.0 - 11.0 thou/uL    RBC 4.74 3.80 - 5.40 mill/uL    Hemoglobin 14.0 12.0 - 16.0 g/dL    Hematocrit 40.7 35.0 - 47.0 %    MCV 86 80 - 100 fL    MCH 29.4 27.0 - 34.0 pg    MCHC 34.3 32.0 - 36.0 g/dL    RDW 12.4 11.0 - 14.5 %    Platelets 344 140 - 440 thou/uL    MPV 6.9 (L) 7.0 - 10.0 fL    Neutrophils % 51 50 - 70 %    Lymphocytes % 37 20 - 40 %    Monocytes % 9 2 - 10 %    Eosinophils % 2 0 - 6 %    Basophils % 1 0 - 2 %    Neutrophils Absolute 3.9 2.0 - 7.7 thou/uL    Lymphocytes Absolute 2.9 0.8 - 4.4 thou/uL    Monocytes Absolute 0.7 0.0 - 0.9 thou/uL    Eosinophils Absolute 0.2 0.0 - 0.4 thou/uL    Basophils Absolute 0.1 0.0 - 0.2 thou/uL       ADDITIONAL HISTORY SUMMARIZED (2): Ángel marhsall 10/18/16 reviewed, recheck of diabetic labs, intermittently taking metformin d/t side effects.  DECISION TO OBTAIN EXTRA INFORMATION (1): None.  RADIOLOGY TESTS (1): None.  LABS (1): Hemoglobin A1c 7.1% 10/18/16. Labs ordered today.  MEDICINE TESTS (1): None.  INDEPENDENT REVIEW (2 each): None.    The visit lasted a total of 12 minutes face to face with the patient. Over 50% of the time was spent counseling and educating the patient about diabetes and neck pain.    Jackie BILLINGSLEY, am scribing for and in the presence of, Dr. Rivas.    I, Dr. Rivas, personally performed the services described in this documentation, as scribed by Jackie Zelaya in my presence, and it is both accurate and  complete.    Dragon dictation was used for this note.  Speech recognition errors are a possibility.    MEDICATIONS:  Current Outpatient Prescriptions   Medication Sig Dispense Refill     copper (PARAGARD) 380 square mm IUD IUD 1 Device by Intrauterine route.       blood glucose test (CONTOUR NEXT TEST STRIPS) strips Dispense item covered by pt ins. 250.00 NIDDM type II - Test 1 time/day 100 strip 0     generic lancets (MICROLET LANCET) Dispense item covered by pt ins. 250.00 NIDDM type II - Test 1 time/day 100 each 0     No current facility-administered medications for this visit.        Total data points: 3

## 2021-06-24 ENCOUNTER — HOSPITAL ENCOUNTER (OUTPATIENT)
Dept: ULTRASOUND IMAGING | Facility: HOSPITAL | Age: 36
Discharge: HOME | End: 2021-06-24
Attending: FAMILY MEDICINE

## 2021-06-24 DIAGNOSIS — Z34.90 PREGNANCY, UNSPECIFIED GESTATIONAL AGE: ICD-10-CM

## 2021-06-26 NOTE — PROGRESS NOTES
36-year-old G3, P2 at 23 weeks and 6 days gestation and a positive blood type.  She has pre-existing type 2 diabetes and is managed with diabetes care at the [facility] [place], [place].  She gets her blood sugars have been well controlled although she has not been able to monitor over the last week due having her freestyle monitor fall out.  She is awaiting the replacement from the insurance company and was unable to get one until now because of coverage.  We had her meet with our pharmacist today who gave her numbers to obtain spare from the company themselves and then also some ideas for adhesives to help keep it in place.  She is having good fetal movement.  No bleeding or cramping.  No headaches or vision changes or lower extremity edema.  Plan to follow-up in 1 month.

## 2021-06-27 ENCOUNTER — HEALTH MAINTENANCE LETTER (OUTPATIENT)
Age: 36
End: 2021-06-27

## 2021-07-06 ENCOUNTER — DOCUMENTATION ONLY (OUTPATIENT)
Dept: ADMINISTRATIVE | Facility: OTHER | Age: 36
End: 2021-07-06

## 2021-07-06 VITALS
OXYGEN SATURATION: 97 % | DIASTOLIC BLOOD PRESSURE: 64 MMHG | HEART RATE: 78 BPM | WEIGHT: 143.4 LBS | BODY MASS INDEX: 27.97 KG/M2 | SYSTOLIC BLOOD PRESSURE: 90 MMHG

## 2021-07-07 NOTE — PROGRESS NOTES
This encounter was created as part of manual pregnancy episode data conversion for the single EHR project. The following information (where applicable) was manually abstracted from the [employer] Epic instance on July 6, 2021: pregnancy episode name/date, dating, episode encounter linking, pregravid weight, number of fetuses, and pregnancy overview and plan.     Candis Woodard RN   Clinical Informatics

## 2021-08-05 ENCOUNTER — PRENATAL OFFICE VISIT (OUTPATIENT)
Dept: FAMILY MEDICINE | Facility: CLINIC | Age: 36
End: 2021-08-05
Payer: COMMERCIAL

## 2021-08-05 VITALS
HEART RATE: 91 BPM | BODY MASS INDEX: 28.03 KG/M2 | WEIGHT: 143.7 LBS | DIASTOLIC BLOOD PRESSURE: 64 MMHG | OXYGEN SATURATION: 98 % | SYSTOLIC BLOOD PRESSURE: 100 MMHG

## 2021-08-05 DIAGNOSIS — E11.65 TYPE 2 DIABETES MELLITUS WITH HYPERGLYCEMIA, WITHOUT LONG-TERM CURRENT USE OF INSULIN (H): ICD-10-CM

## 2021-08-05 DIAGNOSIS — Z34.90 PREGNANCY, UNSPECIFIED GESTATIONAL AGE: Primary | ICD-10-CM

## 2021-08-05 LAB — HBA1C MFR BLD: 6.3 % (ref 0–5.6)

## 2021-08-05 PROCEDURE — 83036 HEMOGLOBIN GLYCOSYLATED A1C: CPT | Performed by: FAMILY MEDICINE

## 2021-08-05 PROCEDURE — 36415 COLL VENOUS BLD VENIPUNCTURE: CPT | Performed by: FAMILY MEDICINE

## 2021-08-05 PROCEDURE — 99207 PR PRENATAL VISIT: CPT | Performed by: FAMILY MEDICINE

## 2021-08-05 NOTE — PROGRESS NOTES
36-year-old G3, P2 at 32 weeks and 2 days gestation.  Pregnancy has been complicated by pre-existing type 2 diabetes.  She has been followed by endocrinology.  She states that they typically review her blood sugars online and just check an A1c when she comes.  She states she is prefer to have the A1c checked here if possible.  She states she is been unable to check her blood sugars because the freestyle valarie sensor keeps falling out despite wearing patching over the top.  She also does not wish to check her blood sugars with a lancet as she does quite a bit of gardening with her hands and she is worried about infection.  I am hoping she will have improved luck using the sensor now that is not quite as warm.  She intends to start again.  A1c went from 5.8 up to 6.3.  She is not currently taking any insulin and is taking her Metformin twice daily.  We may need to restart her insulin pending blood sugar results.  Lots of isaac powell, no fluid leakgage. 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  Declines Covid vaccination.  Encourage active hydration, and restarting blood sugar monitoring.  Follow-up in 2 weeks.

## 2021-08-19 ENCOUNTER — PRENATAL OFFICE VISIT (OUTPATIENT)
Dept: FAMILY MEDICINE | Facility: CLINIC | Age: 36
End: 2021-08-19
Payer: COMMERCIAL

## 2021-08-19 ENCOUNTER — HOSPITAL ENCOUNTER (OUTPATIENT)
Dept: ULTRASOUND IMAGING | Facility: HOSPITAL | Age: 36
Discharge: HOME | End: 2021-08-19
Attending: FAMILY MEDICINE | Admitting: FAMILY MEDICINE
Payer: COMMERCIAL

## 2021-08-19 VITALS
SYSTOLIC BLOOD PRESSURE: 94 MMHG | WEIGHT: 142.1 LBS | BODY MASS INDEX: 27.72 KG/M2 | HEART RATE: 99 BPM | DIASTOLIC BLOOD PRESSURE: 60 MMHG | OXYGEN SATURATION: 98 %

## 2021-08-19 DIAGNOSIS — E11.65 TYPE 2 DIABETES MELLITUS WITH HYPERGLYCEMIA, WITHOUT LONG-TERM CURRENT USE OF INSULIN (H): ICD-10-CM

## 2021-08-19 DIAGNOSIS — Z34.90 PREGNANCY, UNSPECIFIED GESTATIONAL AGE: ICD-10-CM

## 2021-08-19 DIAGNOSIS — E11.65 TYPE 2 DIABETES MELLITUS WITH HYPERGLYCEMIA, WITHOUT LONG-TERM CURRENT USE OF INSULIN (H): Primary | ICD-10-CM

## 2021-08-19 PROCEDURE — 76816 OB US FOLLOW-UP PER FETUS: CPT

## 2021-08-19 PROCEDURE — 76819 FETAL BIOPHYS PROFIL W/O NST: CPT

## 2021-08-19 PROCEDURE — 99207 PR PRENATAL VISIT: CPT | Performed by: FAMILY MEDICINE

## 2021-08-21 NOTE — PROGRESS NOTES
36-year-old  at 34 weeks and 4 days gestation a positive blood type.  She has pre-existing type 2 diabetes.  She is following with endocrinology and is stopped as she felt that there was no benefit.  She is had difficulty checking her blood sugars because her sensor keeps falling off.  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  She will also bump it on things.  She tried using the adhesive coverings and they just came off.  She does not have good information for me regarding her blood sugars but they do appear to be consistently elevated at least in the morning for the few days that she had with him being 1 teens to 140s.  She still taking her Metformin twice daily.  Previously she is taking insulin but does not remember dosing and feels that it is probably premeal.  At this time we will start Lantus at 8 units at night given her consistently elevated fasting blood sugars.  She likely does need mealtime insulin however given the lack of data is difficult to make recommendations.  I referred her to our diabetes educators in our clinic.  We will work with them here to maintain normal blood sugars for the end of pregnancy.  I recommended weekly BPP's with NSTs for the remainder of the pregnancy given her advanced maternal age and type 2 diabetes.  She will follow-up in 1 week, sooner if needed.

## 2021-09-02 ENCOUNTER — PRENATAL OFFICE VISIT (OUTPATIENT)
Dept: FAMILY MEDICINE | Facility: CLINIC | Age: 36
End: 2021-09-02
Payer: COMMERCIAL

## 2021-09-02 VITALS
DIASTOLIC BLOOD PRESSURE: 80 MMHG | WEIGHT: 141.5 LBS | HEART RATE: 88 BPM | SYSTOLIC BLOOD PRESSURE: 120 MMHG | BODY MASS INDEX: 27.6 KG/M2 | OXYGEN SATURATION: 98 %

## 2021-09-02 DIAGNOSIS — Z34.90 PREGNANCY, UNSPECIFIED GESTATIONAL AGE: ICD-10-CM

## 2021-09-02 DIAGNOSIS — E11.65 TYPE 2 DIABETES MELLITUS WITH HYPERGLYCEMIA, WITHOUT LONG-TERM CURRENT USE OF INSULIN (H): Primary | ICD-10-CM

## 2021-09-02 PROCEDURE — 87653 STREP B DNA AMP PROBE: CPT | Performed by: FAMILY MEDICINE

## 2021-09-02 PROCEDURE — 99207 PR PRENATAL VISIT: CPT | Performed by: FAMILY MEDICINE

## 2021-09-03 LAB
GP B STREP DNA SPEC QL NAA+PROBE: NEGATIVE
PATIENT PENICILLIN, AMOXICILLIN, CEPHALOSPORINS ALLERGY: NO

## 2021-09-03 NOTE — PROGRESS NOTES
37 yo  at 36w2d, A pos blood type, chronic T2DM on metformin and Lantus 6 units.  Emphasized the importance for routine blood sugar testing QID. She will resume using her freestyle valarie. She has stopped seeing endocrinology and she was not able to connect with her team and has been unable to schedule with diabetes education.  She will send me her blood sugars this week and we will make adjustments in medication regimen.  She is starting weekly BPP with NST.  GBS is done today.

## 2021-09-09 ENCOUNTER — PRENATAL OFFICE VISIT (OUTPATIENT)
Dept: FAMILY MEDICINE | Facility: CLINIC | Age: 36
End: 2021-09-09
Payer: COMMERCIAL

## 2021-09-09 ENCOUNTER — HOSPITAL ENCOUNTER (OUTPATIENT)
Facility: HOSPITAL | Age: 36
Discharge: HOME | End: 2021-09-09
Attending: FAMILY MEDICINE | Admitting: FAMILY MEDICINE
Payer: COMMERCIAL

## 2021-09-09 ENCOUNTER — MYC MEDICAL ADVICE (OUTPATIENT)
Dept: FAMILY MEDICINE | Facility: CLINIC | Age: 36
End: 2021-09-09

## 2021-09-09 ENCOUNTER — HOSPITAL ENCOUNTER (OUTPATIENT)
Dept: ULTRASOUND IMAGING | Facility: HOSPITAL | Age: 36
Discharge: HOME | End: 2021-09-09
Attending: FAMILY MEDICINE | Admitting: FAMILY MEDICINE
Payer: COMMERCIAL

## 2021-09-09 VITALS
BODY MASS INDEX: 27.97 KG/M2 | DIASTOLIC BLOOD PRESSURE: 64 MMHG | SYSTOLIC BLOOD PRESSURE: 96 MMHG | WEIGHT: 143.4 LBS | HEART RATE: 93 BPM | OXYGEN SATURATION: 98 %

## 2021-09-09 VITALS
DIASTOLIC BLOOD PRESSURE: 86 MMHG | RESPIRATION RATE: 18 BRPM | TEMPERATURE: 98.1 F | HEART RATE: 86 BPM | SYSTOLIC BLOOD PRESSURE: 121 MMHG

## 2021-09-09 DIAGNOSIS — Z34.90 PREGNANCY, UNSPECIFIED GESTATIONAL AGE: Primary | ICD-10-CM

## 2021-09-09 DIAGNOSIS — Z34.90 PREGNANCY, UNSPECIFIED GESTATIONAL AGE: ICD-10-CM

## 2021-09-09 DIAGNOSIS — E11.65 TYPE 2 DIABETES MELLITUS WITH HYPERGLYCEMIA, WITHOUT LONG-TERM CURRENT USE OF INSULIN (H): ICD-10-CM

## 2021-09-09 DIAGNOSIS — E11.65 TYPE 2 DIABETES MELLITUS WITH HYPERGLYCEMIA, WITHOUT LONG-TERM CURRENT USE OF INSULIN (H): Primary | ICD-10-CM

## 2021-09-09 PROBLEM — Z36.89 ENCOUNTER FOR TRIAGE IN PREGNANT PATIENT: Status: ACTIVE | Noted: 2021-09-09

## 2021-09-09 LAB
GLUCOSE BLD-MCNC: 65 MG/DL (ref 79–116)
HBA1C MFR BLD: 6.3 % (ref 0–5.6)

## 2021-09-09 PROCEDURE — 83036 HEMOGLOBIN GLYCOSYLATED A1C: CPT | Performed by: FAMILY MEDICINE

## 2021-09-09 PROCEDURE — 99207 PR PRENATAL VISIT: CPT | Performed by: FAMILY MEDICINE

## 2021-09-09 PROCEDURE — 36415 COLL VENOUS BLD VENIPUNCTURE: CPT | Performed by: FAMILY MEDICINE

## 2021-09-09 PROCEDURE — 82947 ASSAY GLUCOSE BLOOD QUANT: CPT | Performed by: FAMILY MEDICINE

## 2021-09-09 PROCEDURE — 59025 FETAL NON-STRESS TEST: CPT | Mod: 76

## 2021-09-09 PROCEDURE — 76816 OB US FOLLOW-UP PER FETUS: CPT

## 2021-09-09 PROCEDURE — 76819 FETAL BIOPHYS PROFIL W/O NST: CPT

## 2021-09-09 NOTE — DISCHARGE INSTRUCTIONS
Discharge Instruction for Undelivered Patients      You were seen for: Non stress test and biophysical profile  We Consulted: none  You had (Test or Medicine):See above     Diet:   Drink 8 to 12 glasses of liquids (milk, juice, water) every day.  You may eat meals and snacks.     Activity:  Call your doctor or nurse midwife if your baby is moving less than usual. activity as tolerated, no restrictions at this time.     Call your provider if you notice:  Swelling in your face or increased swelling in your hands or legs.  Headaches that are not relieved by Tylenol (acetaminophen).  Changes in your vision (blurring: seeing spots or stars.)  Nausea (sick to your stomach) and vomiting (throwing up).   Weight gain of 5 pounds or more per week.  Heartburn that doesn't go away.  Signs of bladder infection: pain when you urinate (use the toilet), need to go more often and more urgently.  The bag of lainez (rupture of membranes) breaks, or you notice leaking in your underwear.  Bright red blood in your underwear.  Abdominal (lower belly) or stomach pain.  For first baby: Contractions (tightening) less than 5 minutes apart for one hour or more.  Second (plus) baby: Contractions (tightening) less than 10 minutes apart and getting stronger.  *If less than 34 weeks: Contractions (tightening) more than 6 times in one hour.  Increase or change in vaginal discharge (note the color and amount)      Follow-up:  As scheduled in the clinic

## 2021-09-09 NOTE — PROGRESS NOTES
Pt here for scheduled NST and bpp. Dr. Rivas notified of reactive NST with moderate variability and accels present, no decels noted. Pt having contractions q 4.5 min but pt does not feel this is labor. bpp 8/8 with SDP 3.4cm. orders received for discharge.

## 2021-09-09 NOTE — PROGRESS NOTES
BPP with NST and EFW today- normal fetal growth 37%, 8/8 with reactive NST   A1c 6.3, Glucose 65  36-year-old G3, P2 at 37 weeks and 2 days gestation.  GBS negative and a positive blood type.  Pregnancy is complicated by pre-existing type 2 diabetes.  She has been maintained on Metformin and Lantus 6 units at night.  Today she reports that she was able to get her freestyle monitor in place and her blood sugars have been fairly consistently registering at about 55.  She is not been symptomatic with this.  She states it does not change at all with eating.  She has been eating 3 meals and taking in things such as smoothies and food items that would normally increase her blood sugars without seeing that improvement.  Its been present for about 3 to 4 days and seemed to start with the placement of her new sensor.  She has gained weight since last visit, 2 pounds.  She is still below her prepregnancy weight.  She was previously managed by endocrinology for the pregnancy.  But a month ago, she stopped those visits and went about 2 to 3 weeks without checking blood sugars at all.  She is aware of symptoms associated with low blood sugars.  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  Objective:  On her meter, blood sugar is 55.  Point-of-care testing shows a blood sugar of 65 and hemoglobin A1c of 6.3.  Fetal heart tones are in the 140s, fundal height is 34  Assessment/plan:  1.  Type 2 diabetes in pregnancy: With the worry of low blood sugars, she will stop Lantus.  Continue with Metformin as this is likely to be causing low blood sugars.  Because of the unusual tracking of blood sugar showing no change in eating, I wonder if it is more of a sensor issue.  Encourage her to change the sensor and continue to monitor.  We discussed eating 3 meals +2 snacks will include both protein and carbohydrates in the appropriate balance.  She will continue to message me with her blood sugar levels.  2.  Weekly  biophysical profiles with NST.  She is not able to schedule one this week so we scheduled 1 for her.  She was 8 out of 8 with reactive NST and estimated fetal weight its 37 percentile.  3. Follow up in 1 week, sooner if needed

## 2021-09-13 ENCOUNTER — HOSPITAL ENCOUNTER (OUTPATIENT)
Dept: ULTRASOUND IMAGING | Facility: HOSPITAL | Age: 36
Discharge: HOME | End: 2021-09-13
Attending: FAMILY MEDICINE | Admitting: FAMILY MEDICINE
Payer: COMMERCIAL

## 2021-09-13 DIAGNOSIS — E11.65 TYPE 2 DIABETES MELLITUS WITH HYPERGLYCEMIA, WITHOUT LONG-TERM CURRENT USE OF INSULIN (H): ICD-10-CM

## 2021-09-13 DIAGNOSIS — Z34.90 PREGNANCY, UNSPECIFIED GESTATIONAL AGE: ICD-10-CM

## 2021-09-13 PROCEDURE — 76816 OB US FOLLOW-UP PER FETUS: CPT

## 2021-09-13 PROCEDURE — 76819 FETAL BIOPHYS PROFIL W/O NST: CPT

## 2021-09-14 ENCOUNTER — PRENATAL OFFICE VISIT (OUTPATIENT)
Dept: FAMILY MEDICINE | Facility: CLINIC | Age: 36
End: 2021-09-14
Payer: COMMERCIAL

## 2021-09-14 VITALS
HEART RATE: 92 BPM | DIASTOLIC BLOOD PRESSURE: 72 MMHG | SYSTOLIC BLOOD PRESSURE: 116 MMHG | BODY MASS INDEX: 27.83 KG/M2 | WEIGHT: 142.7 LBS | OXYGEN SATURATION: 98 %

## 2021-09-14 DIAGNOSIS — Z34.90 PREGNANCY, UNSPECIFIED GESTATIONAL AGE: Primary | ICD-10-CM

## 2021-09-14 PROCEDURE — 99207 PR PRENATAL VISIT: CPT | Performed by: FAMILY MEDICINE

## 2021-09-15 ENCOUNTER — HOSPITAL ENCOUNTER (INPATIENT)
Facility: HOSPITAL | Age: 36
LOS: 2 days | Discharge: HOME | End: 2021-09-17
Attending: FAMILY MEDICINE | Admitting: FAMILY MEDICINE
Payer: COMMERCIAL

## 2021-09-15 ENCOUNTER — ANESTHESIA EVENT (OUTPATIENT)
Dept: OBGYN | Facility: HOSPITAL | Age: 36
End: 2021-09-15
Payer: COMMERCIAL

## 2021-09-15 ENCOUNTER — ANESTHESIA (OUTPATIENT)
Dept: OBGYN | Facility: HOSPITAL | Age: 36
End: 2021-09-15
Payer: COMMERCIAL

## 2021-09-15 PROBLEM — Z34.90 ENCOUNTER FOR INDUCTION OF LABOR: Status: ACTIVE | Noted: 2021-09-15

## 2021-09-15 LAB
ABO/RH(D): NORMAL
ALT SERPL W P-5'-P-CCNC: 11 U/L (ref 0–45)
ANTIBODY SCREEN: NEGATIVE
AST SERPL W P-5'-P-CCNC: 17 U/L (ref 0–40)
CREAT SERPL-MCNC: 0.81 MG/DL (ref 0.6–1.1)
ERYTHROCYTE [DISTWIDTH] IN BLOOD BY AUTOMATED COUNT: 12 % (ref 10–15)
GFR SERPL CREATININE-BSD FRML MDRD: >90 ML/MIN/1.73M2
GLUCOSE BLDC GLUCOMTR-MCNC: 141 MG/DL (ref 70–99)
GLUCOSE BLDC GLUCOMTR-MCNC: 173 MG/DL (ref 70–99)
GLUCOSE BLDC GLUCOMTR-MCNC: 84 MG/DL (ref 70–99)
GLUCOSE BLDC GLUCOMTR-MCNC: 92 MG/DL (ref 70–99)
GLUCOSE BLDC GLUCOMTR-MCNC: 96 MG/DL (ref 70–99)
HCT VFR BLD AUTO: 35 % (ref 35–47)
HGB BLD-MCNC: 11.7 G/DL (ref 11.7–15.7)
HOLD SPECIMEN: NORMAL
KETONES BLD-SCNC: 0.19 MMOL/L
MCH RBC QN AUTO: 29.4 PG (ref 26.5–33)
MCHC RBC AUTO-ENTMCNC: 33.4 G/DL (ref 31.5–36.5)
MCV RBC AUTO: 88 FL (ref 78–100)
PLATELET # BLD AUTO: 203 10E3/UL (ref 150–450)
RBC # BLD AUTO: 3.98 10E6/UL (ref 3.8–5.2)
SARS-COV-2 RNA RESP QL NAA+PROBE: NEGATIVE
SPECIMEN EXPIRATION DATE: NORMAL
T PALLIDUM AB SER QL: NEGATIVE
WBC # BLD AUTO: 11 10E3/UL (ref 4–11)

## 2021-09-15 PROCEDURE — 722N000001 HC LABOR CARE VAGINAL DELIVERY SINGLE

## 2021-09-15 PROCEDURE — 250N000011 HC RX IP 250 OP 636: Performed by: ANESTHESIOLOGY

## 2021-09-15 PROCEDURE — 250N000011 HC RX IP 250 OP 636: Performed by: FAMILY MEDICINE

## 2021-09-15 PROCEDURE — 36415 COLL VENOUS BLD VENIPUNCTURE: CPT | Performed by: FAMILY MEDICINE

## 2021-09-15 PROCEDURE — 250N000009 HC RX 250: Performed by: FAMILY MEDICINE

## 2021-09-15 PROCEDURE — 82565 ASSAY OF CREATININE: CPT | Performed by: FAMILY MEDICINE

## 2021-09-15 PROCEDURE — 00HU33Z INSERTION OF INFUSION DEVICE INTO SPINAL CANAL, PERCUTANEOUS APPROACH: ICD-10-PCS | Performed by: ANESTHESIOLOGY

## 2021-09-15 PROCEDURE — 85027 COMPLETE CBC AUTOMATED: CPT | Performed by: FAMILY MEDICINE

## 2021-09-15 PROCEDURE — 258N000003 HC RX IP 258 OP 636: Performed by: FAMILY MEDICINE

## 2021-09-15 PROCEDURE — 86901 BLOOD TYPING SEROLOGIC RH(D): CPT | Performed by: FAMILY MEDICINE

## 2021-09-15 PROCEDURE — 87635 SARS-COV-2 COVID-19 AMP PRB: CPT | Performed by: FAMILY MEDICINE

## 2021-09-15 PROCEDURE — 84460 ALANINE AMINO (ALT) (SGPT): CPT | Performed by: FAMILY MEDICINE

## 2021-09-15 PROCEDURE — 59400 OBSTETRICAL CARE: CPT | Mod: GC | Performed by: FAMILY MEDICINE

## 2021-09-15 PROCEDURE — 250N000013 HC RX MED GY IP 250 OP 250 PS 637: Performed by: FAMILY MEDICINE

## 2021-09-15 PROCEDURE — 120N000001 HC R&B MED SURG/OB

## 2021-09-15 PROCEDURE — 370N000003 HC ANESTHESIA WARD SERVICE

## 2021-09-15 PROCEDURE — 84450 TRANSFERASE (AST) (SGOT): CPT | Performed by: FAMILY MEDICINE

## 2021-09-15 PROCEDURE — 86780 TREPONEMA PALLIDUM: CPT | Performed by: FAMILY MEDICINE

## 2021-09-15 PROCEDURE — 3E033VJ INTRODUCTION OF OTHER HORMONE INTO PERIPHERAL VEIN, PERCUTANEOUS APPROACH: ICD-10-PCS | Performed by: FAMILY MEDICINE

## 2021-09-15 PROCEDURE — 3E0R3BZ INTRODUCTION OF ANESTHETIC AGENT INTO SPINAL CANAL, PERCUTANEOUS APPROACH: ICD-10-PCS | Performed by: ANESTHESIOLOGY

## 2021-09-15 PROCEDURE — 82010 KETONE BODYS QUAN: CPT | Performed by: FAMILY MEDICINE

## 2021-09-15 RX ORDER — ONDANSETRON 4 MG/1
4 TABLET, ORALLY DISINTEGRATING ORAL EVERY 6 HOURS PRN
Status: DISCONTINUED | OUTPATIENT
Start: 2021-09-15 | End: 2021-09-15 | Stop reason: HOSPADM

## 2021-09-15 RX ORDER — HYDROCORTISONE 2.5 %
CREAM (GRAM) TOPICAL 3 TIMES DAILY PRN
Status: DISCONTINUED | OUTPATIENT
Start: 2021-09-15 | End: 2021-09-17 | Stop reason: HOSPADM

## 2021-09-15 RX ORDER — LIDOCAINE 40 MG/G
CREAM TOPICAL
Status: DISCONTINUED | OUTPATIENT
Start: 2021-09-15 | End: 2021-09-15 | Stop reason: HOSPADM

## 2021-09-15 RX ORDER — MISOPROSTOL 200 UG/1
800 TABLET ORAL
Status: DISCONTINUED | OUTPATIENT
Start: 2021-09-15 | End: 2021-09-17 | Stop reason: HOSPADM

## 2021-09-15 RX ORDER — NALOXONE HYDROCHLORIDE 0.4 MG/ML
0.2 INJECTION, SOLUTION INTRAMUSCULAR; INTRAVENOUS; SUBCUTANEOUS
Status: DISCONTINUED | OUTPATIENT
Start: 2021-09-15 | End: 2021-09-17 | Stop reason: HOSPADM

## 2021-09-15 RX ORDER — NALOXONE HYDROCHLORIDE 0.4 MG/ML
0.4 INJECTION, SOLUTION INTRAMUSCULAR; INTRAVENOUS; SUBCUTANEOUS
Status: DISCONTINUED | OUTPATIENT
Start: 2021-09-15 | End: 2021-09-15 | Stop reason: HOSPADM

## 2021-09-15 RX ORDER — IBUPROFEN 600 MG/1
600 TABLET, FILM COATED ORAL
Status: DISCONTINUED | OUTPATIENT
Start: 2021-09-15 | End: 2021-09-17 | Stop reason: HOSPADM

## 2021-09-15 RX ORDER — KETOROLAC TROMETHAMINE 30 MG/ML
30 INJECTION, SOLUTION INTRAMUSCULAR; INTRAVENOUS
Status: DISCONTINUED | OUTPATIENT
Start: 2021-09-15 | End: 2021-09-17 | Stop reason: HOSPADM

## 2021-09-15 RX ORDER — BISACODYL 10 MG
10 SUPPOSITORY, RECTAL RECTAL DAILY PRN
Status: DISCONTINUED | OUTPATIENT
Start: 2021-09-15 | End: 2021-09-17 | Stop reason: HOSPADM

## 2021-09-15 RX ORDER — DOCUSATE SODIUM 100 MG/1
100 CAPSULE, LIQUID FILLED ORAL DAILY
Status: DISCONTINUED | OUTPATIENT
Start: 2021-09-15 | End: 2021-09-17 | Stop reason: HOSPADM

## 2021-09-15 RX ORDER — OXYTOCIN 10 [USP'U]/ML
10 INJECTION, SOLUTION INTRAMUSCULAR; INTRAVENOUS
Status: DISCONTINUED | OUTPATIENT
Start: 2021-09-15 | End: 2021-09-17 | Stop reason: HOSPADM

## 2021-09-15 RX ORDER — NALOXONE HYDROCHLORIDE 0.4 MG/ML
0.2 INJECTION, SOLUTION INTRAMUSCULAR; INTRAVENOUS; SUBCUTANEOUS
Status: DISCONTINUED | OUTPATIENT
Start: 2021-09-15 | End: 2021-09-15 | Stop reason: HOSPADM

## 2021-09-15 RX ORDER — IBUPROFEN 800 MG/1
800 TABLET, FILM COATED ORAL EVERY 6 HOURS PRN
Status: DISCONTINUED | OUTPATIENT
Start: 2021-09-15 | End: 2021-09-17 | Stop reason: HOSPADM

## 2021-09-15 RX ORDER — MISOPROSTOL 200 UG/1
400 TABLET ORAL
Status: DISCONTINUED | OUTPATIENT
Start: 2021-09-15 | End: 2021-09-17 | Stop reason: HOSPADM

## 2021-09-15 RX ORDER — TERBUTALINE SULFATE 1 MG/ML
0.25 INJECTION, SOLUTION SUBCUTANEOUS
Status: DISCONTINUED | OUTPATIENT
Start: 2021-09-15 | End: 2021-09-15 | Stop reason: HOSPADM

## 2021-09-15 RX ORDER — OXYTOCIN/0.9 % SODIUM CHLORIDE 30/500 ML
340 PLASTIC BAG, INJECTION (ML) INTRAVENOUS CONTINUOUS PRN
Status: DISCONTINUED | OUTPATIENT
Start: 2021-09-15 | End: 2021-09-15 | Stop reason: HOSPADM

## 2021-09-15 RX ORDER — OXYTOCIN/0.9 % SODIUM CHLORIDE 30/500 ML
100-340 PLASTIC BAG, INJECTION (ML) INTRAVENOUS CONTINUOUS PRN
Status: DISCONTINUED | OUTPATIENT
Start: 2021-09-15 | End: 2021-09-17 | Stop reason: HOSPADM

## 2021-09-15 RX ORDER — METOCLOPRAMIDE 10 MG/1
10 TABLET ORAL EVERY 6 HOURS PRN
Status: DISCONTINUED | OUTPATIENT
Start: 2021-09-15 | End: 2021-09-15 | Stop reason: HOSPADM

## 2021-09-15 RX ORDER — DEXTROSE MONOHYDRATE 25 G/50ML
25-50 INJECTION, SOLUTION INTRAVENOUS
Status: DISCONTINUED | OUTPATIENT
Start: 2021-09-15 | End: 2021-09-15 | Stop reason: HOSPADM

## 2021-09-15 RX ORDER — HYDROCODONE BITARTRATE AND ACETAMINOPHEN 5; 325 MG/1; MG/1
1-2 TABLET ORAL EVERY 6 HOURS PRN
Status: DISCONTINUED | OUTPATIENT
Start: 2021-09-15 | End: 2021-09-17 | Stop reason: HOSPADM

## 2021-09-15 RX ORDER — OXYTOCIN/0.9 % SODIUM CHLORIDE 30/500 ML
1-24 PLASTIC BAG, INJECTION (ML) INTRAVENOUS CONTINUOUS
Status: DISCONTINUED | OUTPATIENT
Start: 2021-09-15 | End: 2021-09-15 | Stop reason: HOSPADM

## 2021-09-15 RX ORDER — BUPIVACAINE HYDROCHLORIDE 2.5 MG/ML
INJECTION, SOLUTION EPIDURAL; INFILTRATION; INTRACAUDAL
Status: COMPLETED | OUTPATIENT
Start: 2021-09-15 | End: 2021-09-15

## 2021-09-15 RX ORDER — NICOTINE POLACRILEX 4 MG
15-30 LOZENGE BUCCAL
Status: DISCONTINUED | OUTPATIENT
Start: 2021-09-15 | End: 2021-09-17 | Stop reason: HOSPADM

## 2021-09-15 RX ORDER — OXYTOCIN 10 [USP'U]/ML
INJECTION, SOLUTION INTRAMUSCULAR; INTRAVENOUS
Status: DISPENSED
Start: 2021-09-15 | End: 2021-09-15

## 2021-09-15 RX ORDER — NALOXONE HYDROCHLORIDE 0.4 MG/ML
0.4 INJECTION, SOLUTION INTRAMUSCULAR; INTRAVENOUS; SUBCUTANEOUS
Status: DISCONTINUED | OUTPATIENT
Start: 2021-09-15 | End: 2021-09-17 | Stop reason: HOSPADM

## 2021-09-15 RX ORDER — EPHEDRINE SULFATE 50 MG/ML
5 INJECTION, SOLUTION INTRAMUSCULAR; INTRAVENOUS; SUBCUTANEOUS
Status: DISCONTINUED | OUTPATIENT
Start: 2021-09-15 | End: 2021-09-15 | Stop reason: HOSPADM

## 2021-09-15 RX ORDER — METHYLERGONOVINE MALEATE 0.2 MG/ML
200 INJECTION INTRAVENOUS
Status: DISCONTINUED | OUTPATIENT
Start: 2021-09-15 | End: 2021-09-17 | Stop reason: HOSPADM

## 2021-09-15 RX ORDER — OXYTOCIN/0.9 % SODIUM CHLORIDE 30/500 ML
340 PLASTIC BAG, INJECTION (ML) INTRAVENOUS CONTINUOUS PRN
Status: DISCONTINUED | OUTPATIENT
Start: 2021-09-15 | End: 2021-09-17 | Stop reason: HOSPADM

## 2021-09-15 RX ORDER — METHYLERGONOVINE MALEATE 0.2 MG/ML
200 INJECTION INTRAVENOUS
Status: DISCONTINUED | OUTPATIENT
Start: 2021-09-15 | End: 2021-09-15 | Stop reason: HOSPADM

## 2021-09-15 RX ORDER — PROCHLORPERAZINE 25 MG
25 SUPPOSITORY, RECTAL RECTAL EVERY 12 HOURS PRN
Status: DISCONTINUED | OUTPATIENT
Start: 2021-09-15 | End: 2021-09-15 | Stop reason: HOSPADM

## 2021-09-15 RX ORDER — OXYTOCIN 10 [USP'U]/ML
10 INJECTION, SOLUTION INTRAMUSCULAR; INTRAVENOUS
Status: DISCONTINUED | OUTPATIENT
Start: 2021-09-15 | End: 2021-09-15 | Stop reason: HOSPADM

## 2021-09-15 RX ORDER — CARBOPROST TROMETHAMINE 250 UG/ML
250 INJECTION, SOLUTION INTRAMUSCULAR
Status: DISCONTINUED | OUTPATIENT
Start: 2021-09-15 | End: 2021-09-15 | Stop reason: HOSPADM

## 2021-09-15 RX ORDER — SODIUM CHLORIDE, SODIUM LACTATE, POTASSIUM CHLORIDE, CALCIUM CHLORIDE 600; 310; 30; 20 MG/100ML; MG/100ML; MG/100ML; MG/100ML
INJECTION, SOLUTION INTRAVENOUS CONTINUOUS
Status: DISCONTINUED | OUTPATIENT
Start: 2021-09-15 | End: 2021-09-17 | Stop reason: HOSPADM

## 2021-09-15 RX ORDER — LIDOCAINE HYDROCHLORIDE 10 MG/ML
INJECTION, SOLUTION EPIDURAL; INFILTRATION; INTRACAUDAL; PERINEURAL
Status: DISPENSED
Start: 2021-09-15 | End: 2021-09-15

## 2021-09-15 RX ORDER — PROCHLORPERAZINE MALEATE 10 MG
10 TABLET ORAL EVERY 6 HOURS PRN
Status: DISCONTINUED | OUTPATIENT
Start: 2021-09-15 | End: 2021-09-15 | Stop reason: HOSPADM

## 2021-09-15 RX ORDER — NALBUPHINE HYDROCHLORIDE 10 MG/ML
2.5-5 INJECTION, SOLUTION INTRAMUSCULAR; INTRAVENOUS; SUBCUTANEOUS EVERY 6 HOURS PRN
Status: DISCONTINUED | OUTPATIENT
Start: 2021-09-15 | End: 2021-09-17 | Stop reason: HOSPADM

## 2021-09-15 RX ORDER — ACETAMINOPHEN 325 MG/1
650 TABLET ORAL EVERY 4 HOURS PRN
Status: DISCONTINUED | OUTPATIENT
Start: 2021-09-15 | End: 2021-09-17 | Stop reason: HOSPADM

## 2021-09-15 RX ORDER — LIDOCAINE HYDROCHLORIDE 20 MG/ML
SOLUTION OROPHARYNGEAL
Status: DISPENSED
Start: 2021-09-15 | End: 2021-09-15

## 2021-09-15 RX ORDER — MISOPROSTOL 200 UG/1
TABLET ORAL
Status: DISPENSED
Start: 2021-09-15 | End: 2021-09-15

## 2021-09-15 RX ORDER — SODIUM CHLORIDE 9 MG/ML
INJECTION, SOLUTION INTRAVENOUS CONTINUOUS
Status: DISCONTINUED | OUTPATIENT
Start: 2021-09-15 | End: 2021-09-15 | Stop reason: HOSPADM

## 2021-09-15 RX ORDER — MODIFIED LANOLIN
OINTMENT (GRAM) TOPICAL
Status: DISCONTINUED | OUTPATIENT
Start: 2021-09-15 | End: 2021-09-17 | Stop reason: HOSPADM

## 2021-09-15 RX ORDER — MISOPROSTOL 200 UG/1
400 TABLET ORAL
Status: DISCONTINUED | OUTPATIENT
Start: 2021-09-15 | End: 2021-09-15 | Stop reason: HOSPADM

## 2021-09-15 RX ORDER — ONDANSETRON 2 MG/ML
4 INJECTION INTRAMUSCULAR; INTRAVENOUS EVERY 6 HOURS PRN
Status: DISCONTINUED | OUTPATIENT
Start: 2021-09-15 | End: 2021-09-15 | Stop reason: HOSPADM

## 2021-09-15 RX ORDER — NICOTINE POLACRILEX 4 MG
15-30 LOZENGE BUCCAL
Status: DISCONTINUED | OUTPATIENT
Start: 2021-09-15 | End: 2021-09-15 | Stop reason: HOSPADM

## 2021-09-15 RX ORDER — DEXTROSE MONOHYDRATE 25 G/50ML
25-50 INJECTION, SOLUTION INTRAVENOUS
Status: DISCONTINUED | OUTPATIENT
Start: 2021-09-15 | End: 2021-09-17 | Stop reason: HOSPADM

## 2021-09-15 RX ORDER — METOCLOPRAMIDE HYDROCHLORIDE 5 MG/ML
10 INJECTION INTRAMUSCULAR; INTRAVENOUS EVERY 6 HOURS PRN
Status: DISCONTINUED | OUTPATIENT
Start: 2021-09-15 | End: 2021-09-15 | Stop reason: HOSPADM

## 2021-09-15 RX ORDER — MISOPROSTOL 200 UG/1
800 TABLET ORAL
Status: DISCONTINUED | OUTPATIENT
Start: 2021-09-15 | End: 2021-09-15 | Stop reason: HOSPADM

## 2021-09-15 RX ORDER — CARBOPROST TROMETHAMINE 250 UG/ML
250 INJECTION, SOLUTION INTRAMUSCULAR
Status: DISCONTINUED | OUTPATIENT
Start: 2021-09-15 | End: 2021-09-17 | Stop reason: HOSPADM

## 2021-09-15 RX ADMIN — Medication 12 ML/HR: at 12:33

## 2021-09-15 RX ADMIN — ACETAMINOPHEN 650 MG: 325 TABLET ORAL at 19:59

## 2021-09-15 RX ADMIN — HYDROCODONE BITARTRATE AND ACETAMINOPHEN 1 TABLET: 5; 325 TABLET ORAL at 20:40

## 2021-09-15 RX ADMIN — DOCUSATE SODIUM 100 MG: 100 CAPSULE, LIQUID FILLED ORAL at 18:59

## 2021-09-15 RX ADMIN — BUPIVACAINE HYDROCHLORIDE 10 ML: 2.5 INJECTION, SOLUTION EPIDURAL; INFILTRATION; INTRACAUDAL at 12:46

## 2021-09-15 RX ADMIN — Medication 2 MILLI-UNITS/MIN: at 09:04

## 2021-09-15 RX ADMIN — SODIUM CHLORIDE, POTASSIUM CHLORIDE, SODIUM LACTATE AND CALCIUM CHLORIDE: 600; 310; 30; 20 INJECTION, SOLUTION INTRAVENOUS at 08:54

## 2021-09-15 RX ADMIN — KETOROLAC TROMETHAMINE 30 MG: 30 INJECTION, SOLUTION INTRAMUSCULAR; INTRAVENOUS at 17:06

## 2021-09-15 ASSESSMENT — ACTIVITIES OF DAILY LIVING (ADL)
FALL_HISTORY_WITHIN_LAST_SIX_MONTHS: NO
DIFFICULTY_COMMUNICATING: NO
DIFFICULTY_EATING/SWALLOWING: NO
TOILETING_ISSUES: NO
DRESSING/BATHING_DIFFICULTY: NO
WALKING_OR_CLIMBING_STAIRS_DIFFICULTY: NO
HEARING_DIFFICULTY_OR_DEAF: NO
CONCENTRATING,_REMEMBERING_OR_MAKING_DECISIONS_DIFFICULTY: NO
WEAR_GLASSES_OR_BLIND: NO
DOING_ERRANDS_INDEPENDENTLY_DIFFICULTY: NO

## 2021-09-15 ASSESSMENT — MIFFLIN-ST. JEOR: SCORE: 1250.61

## 2021-09-15 NOTE — PROGRESS NOTES
Received nursing call regarding borderline blood pressure elevation and complaints of headache.  BP currently within normal limits.  NO history of hypertension or elevated blood pressures.  Had intrathecal for anesthesia for delivery.  Suspect symptoms are related to spinal headache however will order HELLP panel for further evaluation.  Anesthesia is aware of patient's symptoms.  Ordered vicodin for pain relief if needed.

## 2021-09-15 NOTE — ANESTHESIA PROCEDURE NOTES
Epidural catheter Procedure Note  Pre-Procedure   Staff -        Anesthesiologist:  Jackie Mendoza MD       Performed By: anesthesiologist       Location: OB       Pre-Anesthestic Checklist: patient identified, IV checked, risks and benefits discussed, informed consent, monitors and equipment checked, pre-op evaluation, at physician/surgeon's request and post-op pain management  Timeout:       Correct Patient: Yes        Correct Procedure: Yes        Correct Site: Yes        Correct Position: Yes   Procedure Documentation  Procedure: epidural catheter       Diagnosis: Labor Pain       Patient Position: sitting       Skin prep: Betadine      Local skin infiltrated with mL of 1% lidocaine.        Insertion Site: L4-5. (midline approach).       Technique: LORT air        Needle Type: ToBlend Labsy needle       Needle Gauge: 18.        Needle Length (Inches): 3.5        Catheter: 20 G.         Catheter threaded easily.         # of attempts: 1 and  # of redirects:  0    Assessment/Narrative         Paresthesias: No.      Test dose of 3 mL lidocaine 1.5% w/ 1:200,000 epinephrine at.         Test dose negative, 3 minutes after injection, for signs of intravascular, subdural, or intrathecal injection.       Insertion/Infusion Method: LORT air       Aspiration negative for Heme or CSF via Epidural Catheter.    Medication(s) Administered   0.25% Bupivacaine PF (Epidural), 10 mL  0.125% Bupivacaine + 2 mcg/mL Fentanyl via CADD (Epidural), 12 mL  Medication Administration Time: 9/15/2021 12:46 PM

## 2021-09-15 NOTE — PROGRESS NOTES
Updated Dr Rivas regarding patient BP's and that pt is experiencing back pain and headache. Orders received.

## 2021-09-15 NOTE — ANESTHESIA PROCEDURE NOTES
Epidural catheter Procedure Note  Pre-Procedure   Staff -        Anesthesiologist:  Jackie Mendoza MD       Performed By: anesthesiologist       Location: OB       Procedure Start/Stop Times: 9/15/2021 1:27 PM and 9/15/2021 2:02 PM       Pre-Anesthestic Checklist: patient identified, IV checked, risks and benefits discussed, informed consent, monitors and equipment checked, pre-op evaluation, at physician/surgeon's request and post-op pain management  Timeout:       Correct Patient: Yes        Correct Procedure: Yes        Correct Site: Yes        Correct Position: Yes   Procedure Documentation  Procedure: epidural catheter       Diagnosis: Labor Pain       Patient Position: sitting       Patient Prep/Sterile Barriers: sterile gloves, mask, patient draped       Skin prep: Betadine      Local skin infiltrated with mL of 1% lidocaine.        Insertion Site: L4-5. (midline approach).       Technique: LORT air        Needle Type: Touhy needle       Needle Gauge: 18.        Needle Length (Inches): 3.5        Catheter: 20 G.         Catheter threaded easily.         # of attempts: 1 and  # of redirects:  0    Assessment/Narrative         Paresthesias: No.      Test dose of 1 mL lidocaine 1.5% w/ 1:200,000 epinephrine at.         Test dose not negative, 3 minutes after injection, for signs of intravascular, subdural, or intrathecal injection.       Insertion/Infusion Method: LORT air       No aspiration negative for Heme or CSF via Epidural Catheter.    Comments:  Failed to appreciated loss of resistance at the expected needle insertion depth. Removed needle to discover it was obstructed. Needle replaced wi

## 2021-09-15 NOTE — PROGRESS NOTES
Called anesthesia regarding intrathecal catheter. MD stated RN can remove catheter. Also informed MD that patient is having pain between shoulder blades and headache. Pt rates pain 7 when sitting and 4 when laying down. Anesthesia said to monitor and if no relief in a few hours to call back.

## 2021-09-15 NOTE — ANESTHESIA PREPROCEDURE EVALUATION
Anesthesia Pre-Procedure Evaluation    Patient: Selena Lui   MRN: [id number] : 1985        Preoperative Diagnosis: * No pre-op diagnosis entered *   Procedure : * No procedures listed *     Past Medical History:   Diagnosis Date     ASCUS of cervix with negative high risk HPV 2015 NIL 7/28/15 ASCUS, neg HPV 9/1/15 NIL 3/8/21 NIL pap, neg HPV. Routine screening     Diabetes (H) 10/18/2016     Tears of meniscus and ACL of left knee 10/18/2016      Past Surgical History:   Procedure Laterality Date     ANTERIOR CRUCIATE LIGAMENT REPAIR Left           No Known Allergies   Social History     Tobacco Use     Smoking status: Never Smoker     Smokeless tobacco: Never Used   Substance Use Topics     Alcohol use: Never      Wt Readings from Last 1 Encounters:   09/15/21 64.4 kg (142 lb)        Anesthesia Evaluation            ROS/MED HX  ENT/Pulmonary:       Neurologic:       Cardiovascular:       METS/Exercise Tolerance:     Hematologic:       Musculoskeletal:       GI/Hepatic:       Renal/Genitourinary:       Endo:     (+) type I DM,     Psychiatric/Substance Use:       Infectious Disease:       Malignancy:       Other:      (+) Possibly pregnant, ,         Physical Exam    Airway        Mallampati: II    Neck ROM: full     Respiratory Devices and Support         Dental  no notable dental history         Cardiovascular   cardiovascular exam normal          Pulmonary   pulmonary exam normal                OUTSIDE LABS:  CBC:   Lab Results   Component Value Date    WBC 8.6 2021    WBC 7.8 2018    HGB 12.6 2021    HGB 14.0 2018    HCT 38.0 2021    HCT 40.7 2018     2021     2018     BMP:   Lab Results   Component Value Date     2021    POTASSIUM 3.9 2021    CHLORIDE 104 2021    CO2 22 2021    BUN 8 2021    CR 0.62 2021     (H) 09/15/2021     (H) 09/15/2021     COAGS: No results found  for: PTT, INR, FIBR  POC:   Lab Results   Component Value Date    HCG Positive (A) 02/01/2021     HEPATIC:   Lab Results   Component Value Date    ALBUMIN 3.7 02/01/2021    PROTTOTAL 7.1 02/01/2021    ALT 23 02/01/2021    AST 13 02/01/2021    ALKPHOS 52 02/01/2021    BILITOTAL 0.3 02/01/2021     OTHER:   Lab Results   Component Value Date    A1C 6.3 (H) 09/09/2021    MARTHA 8.9 02/01/2021    TSH 2.12 03/08/2021    CRP 0.9 (H) 09/27/2018    SED 35 (H) 09/27/2018       Anesthesia Plan    ASA Status:  2      Anesthesia Type: Epidural.              Consents    Anesthesia Plan(s) and associated risks, benefits, and realistic alternatives discussed. Questions answered and patient/representative(s) expressed understanding.     - Discussed with:  Patient         Postoperative Care            Comments:                Jackie Mendoza MD

## 2021-09-15 NOTE — PROGRESS NOTES
Great pain control from ITN, attempting to rest, variables noted on the fhr tracing, Resident and dr Rivas called tp the room for sve, repositioning Selena to her back and the bag of lainez was hanging out and the fetus was .   MD at the bedside delivery of viable female placed onto moms abd apgars 8/9.

## 2021-09-15 NOTE — H&P
"[facility] Labor and Delivery History and Physical    Selena Lui MRN# [id number]   Age: 36 year old YOB: 1985     Date of Admission:  9/15/2021    Primary care provider: No Ref-Primary, Physician           Chief Complaint:   Selena Lui is a 36 year old female who is 38w1d pregnant and being admitted for induction of labor, indication uncontrolled T2DM.          Pregnancy history:     OBSTETRIC HISTORY:    OB History    Para Term  AB Living   3 2 2 0 0 1   SAB TAB Ectopic Multiple Live Births   0 0 0 0 1      # Outcome Date GA Lbr Kamar/2nd Weight Sex Delivery Anes PTL Lv   3 Current            2 Term 12    M    CECELIA   1 Term 05    F           EDC: Estimated Date of Delivery: 21    Prenatal Labs:   Lab Results   Component Value Date    AS Negative 09/15/2021    HEPBANG Negative 2021    GCPCRT Negative 2021    HGB 12.6 2021       GBS Status:   No results found for: GBS    Active Problem List  Patient Active Problem List   Diagnosis     Tears of meniscus and ACL of left knee     Diabetes (H)     ASCUS of cervix with negative high risk HPV     Encounter for triage in pregnant patient     Encounter for induction of labor       Medication Prior to Admission  Medications Prior to Admission   Medication Sig Dispense Refill Last Dose     BD BONNIE 2ND GEN PEN NEEDLE 32 gauge x 5/32\" Ndle [BD BONNIE 2ND GEN PEN NEEDLE 32 GAUGE X 5/32\" NDLE] USE TO TEST FOUR TIMES DAILY   Past Month at Unknown time     Continuous Blood Gluc Sensor (FREESTYLE JAGDISH 2 SENSOR) MISC 1 Application every 14 days 4 each 3 9/15/2021 at Unknown time     FREESTYLE JAGDISH 2 SENSOR Kit [FREESTYLE JAGDISH 2 SENSOR KIT] USE AS DIRECTED FOR 90 DAYS.   9/15/2021 at Unknown time     insulin glargine (LANTUS PEN) 100 UNIT/ML pen Inject 8 Units Subcutaneous At Bedtime 15 mL 1 Past Week at Unknown time     metFORMIN (GLUCOPHAGE) 500 MG tablet Take 1 tablet (500 mg) by mouth 2 times daily " (with meals) 180 tablet 3 Past Week at Unknown time   .        Maternal Past Medical History:     Past Medical History:   Diagnosis Date     ASCUS of cervix with negative high risk HPV 2015 NIL 7/28/15 ASCUS, neg HPV 9/1/15 NIL 3/8/21 NIL pap, neg HPV. Routine screening     Diabetes (H) 10/18/2016     Tears of meniscus and ACL of left knee 10/18/2016                       Family History:   This patient has no significant family history            Social History:   This patient has no significant social history         Review of Systems:   CONSTITUTIONAL: NEGATIVE for fever, chills, change in weight  ENT/MOUTH: NEGATIVE for ear, mouth and throat problems  RESP: NEGATIVE for significant cough or SOB  CV: NEGATIVE for chest pain, palpitations or peripheral edema          Physical Exam:   Vitals were reviewed                     Constitutional:   awake, alert, cooperative, no apparent distress, and appears stated age     Lungs:   No increased work of breathing, good air exchange, clear to auscultation bilaterally, no crackles or wheezing     Cardiovascular:   Normal apical impulse, regular rate and rhythm, normal S1 and S2, no S3 or S4, and no murmur noted      Cervix:   Membranes: intact   Dilation: 3   Effacement: 70%   Station:-2   Consistency: soft   Position: Posterior  Presentation:Cephalic  Fetal Heart Rate Tracing: reactive and reassuring  Tocometer: external monitor                       Assessment:   Selena Lui is a 38w1d pregnant female admitted with induction of labor, indication uncontrolled T2DM.          Plan:   Admit - see IP orders  Labor induction with Pitocin  Anticipate     Edwige Rivas MD

## 2021-09-15 NOTE — PROGRESS NOTES
Pitocin not increased at this check due to ctx every 3-4 mins and Selena is breathing through them

## 2021-09-15 NOTE — PLAN OF CARE
Problem: Bleeding (Postpartum Vaginal Delivery)  Goal: Hemostasis  Outcome: Improving     Problem: Pain (Postpartum Vaginal Delivery)  Goal: Acceptable Pain Control  Outcome: Improving     Pt VSS and afebrile. BP's have been 130's/80's and patient has headache but denies any other symptoms. Pt is experiencing pain between the shoulder blades and a headache that is worse when sitting up and pain is relieved when laying flat. Anesthesia and provider aware. Patient has not been up to bathroom yet d/t left leg still being numb. Fundus is firm and bleeding minimal. Patient is breastfeeding well and is latching baby independently. Will continue to monitor.

## 2021-09-15 NOTE — PROGRESS NOTES
Arrived for IOL, Selena reported that she has been contraction over the night about every 10 min that some wake her up, she breaths through some on them, sve same as yesterday. Pitocin started at 0905

## 2021-09-15 NOTE — PROGRESS NOTES
MDA Dr Madison called for epidural evaluation, Selena reports no pain control but left leg is heavy

## 2021-09-15 NOTE — L&D DELIVERY NOTE
Vaginal Delivery Procedure Note    Name: Selena Lui  :  1985  MRN:  [id number]    Delivery Date:  09/15/21    Delivery Time:  1449    Selena Lui is a 36 year old  at 38w1d.    OBSTETRICAL / DATING HISTORY:  Estimated Date of Delivery: Sep 28, 2021  38w1d    Patient is GBS negative, antibiotics were not given. Patients blood type is A POS.     Stage I  Selena Lui was admitted to Labor and Delivery at 9/15/2021  7:39 AM for induction for DM and IUGR. dong every 4-5 minutes, 3 cm dilated, 60% effaced, -2 station.  Onset of labor was at approximately 1100.  SROM 0h 01m before delivery with light meconium stained fluid. Fetal heart tones were monitored by external monitor and contractions by external monitor. Category 2  tracing throughout labor.  Patient received intrathecal anesthesia pain medications.  Labor progressed over 3 hours with complete dilation occuring at 1448, pushing ensued at 1448.    Stage II  At 1449, a healthy appearing  female delivered via induced vaginal delivery in the LOP position. The baby was suctioned following delivery after spontaneous cry. There was a single, loose nuchal cord present. Cord was double clamped and cut by Dad. APGARs 8  and 9  without resuscitation needed A vacuum was not used. There was not a shoulder dystocia.     Stage III  At 9/15/2021  2:54 PM  an   intact placenta was delivered spontaneously.  There were no placental abnormalities identified and a 3-vessel cord was present. Blood gases were not sent and cord blood was not collected. The placenta will be discarded.  IV pitocin in 1L normal saline was given after delivery of the baby.     Lacerations: one small 1st degree laceration but did not require repair. EBL is 127  mL. Fundus firm.    Assessment/Plan  Mother and baby in room skin to skin, ready to initiate breastfeeding. Initiate routine post partum and  nursery order sets.     Marlon Stahl MD    Precepted patient with Dr. Rivas who was  present for the entire delivery.

## 2021-09-16 ENCOUNTER — APPOINTMENT (OUTPATIENT)
Dept: INTERVENTIONAL RADIOLOGY/VASCULAR | Facility: HOSPITAL | Age: 36
End: 2021-09-16
Attending: ANESTHESIOLOGY
Payer: COMMERCIAL

## 2021-09-16 LAB
GLUCOSE BLDC GLUCOMTR-MCNC: 131 MG/DL (ref 70–99)
GLUCOSE BLDC GLUCOMTR-MCNC: 132 MG/DL (ref 70–99)
GLUCOSE BLDC GLUCOMTR-MCNC: 91 MG/DL (ref 70–99)
HGB BLD-MCNC: 10.8 G/DL (ref 11.7–15.7)

## 2021-09-16 PROCEDURE — 3E0R3GC INTRODUCTION OF OTHER THERAPEUTIC SUBSTANCE INTO SPINAL CANAL, PERCUTANEOUS APPROACH: ICD-10-PCS | Performed by: RADIOLOGY

## 2021-09-16 PROCEDURE — 120N000001 HC R&B MED SURG/OB

## 2021-09-16 PROCEDURE — 250N000013 HC RX MED GY IP 250 OP 250 PS 637: Performed by: FAMILY MEDICINE

## 2021-09-16 PROCEDURE — 85018 HEMOGLOBIN: CPT | Performed by: FAMILY MEDICINE

## 2021-09-16 PROCEDURE — 77003 FLUOROGUIDE FOR SPINE INJECT: CPT

## 2021-09-16 PROCEDURE — 36415 COLL VENOUS BLD VENIPUNCTURE: CPT | Performed by: FAMILY MEDICINE

## 2021-09-16 PROCEDURE — 250N000009 HC RX 250: Performed by: RADIOLOGY

## 2021-09-16 RX ORDER — LIDOCAINE HYDROCHLORIDE 10 MG/ML
5 INJECTION, SOLUTION EPIDURAL; INFILTRATION; INTRACAUDAL; PERINEURAL ONCE
Status: COMPLETED | OUTPATIENT
Start: 2021-09-16 | End: 2021-09-16

## 2021-09-16 RX ADMIN — HYDROCODONE BITARTRATE AND ACETAMINOPHEN 2 TABLET: 5; 325 TABLET ORAL at 12:31

## 2021-09-16 RX ADMIN — HYDROCODONE BITARTRATE AND ACETAMINOPHEN 2 TABLET: 5; 325 TABLET ORAL at 03:05

## 2021-09-16 RX ADMIN — IBUPROFEN 800 MG: 800 TABLET, FILM COATED ORAL at 06:58

## 2021-09-16 RX ADMIN — LIDOCAINE HYDROCHLORIDE 5 ML: 10 INJECTION, SOLUTION EPIDURAL; INFILTRATION; INTRACAUDAL; PERINEURAL at 11:44

## 2021-09-16 RX ADMIN — DOCUSATE SODIUM 100 MG: 100 CAPSULE, LIQUID FILLED ORAL at 21:08

## 2021-09-16 RX ADMIN — IBUPROFEN 800 MG: 800 TABLET, FILM COATED ORAL at 00:48

## 2021-09-16 NOTE — PROGRESS NOTES
Interventional Neuroradiology Pre Procedure Assessment    We are asked to perform an epidural blood patch on this 36 year old female     Epidural for labor and delivery yesterday.  Per [facility] note, difficult epidural placement.  Patient with pain in upper back, shoulders and headache worse when sitting up.     No Known Allergies  Labs: Hgb 10.8, plt 203    Exam:   BP 93/61 (BP Location: Right arm)   Pulse 71   Temp 98.1  F (36.7  C) (Oral)   Resp 18   Ht 1.524 m (5')   Wt 64.4 kg (142 lb)   LMP 12/16/2020   SpO2 98%   Breastfeeding Unknown   BMI 27.73 kg/m    Neuro: A/O x4, speech clear and fluent  Cardiac: regular  Lungs: clear    Procedure its risks, benefits, and alternatives were discussed with patient.  Questions and answered and they give written and verbal consent to proceed.    [facility]  Emergency pager: [phone]  Office: [phone]

## 2021-09-16 NOTE — PROGRESS NOTES
[facility]    Post-Partum Progress Note    Assessment & Plan   Assessment:  Post-partum day #1  Normal spontaneous vaginal delivery    Spinal headache s/p blood patching under fluorscopy  Otherwise doing well, breastfeeding    Plan:  Pain control measures as needed  Anticipate discharge tomorrow    Edwige Rivas     Interval History   Doing well.  Pain is well-controlled.  No fevers.  No history of foul-smelling vaginal discharge.  Good appetite.  Denies chest pain, shortness of breath, nausea or vomiting.  Vaginal bleeding is similar to a heavy menstrual flow.  Ambulatory.  Breastfeeding well. Headaches with sitting up consistent with spinal headache, now s/p blood patching under fluoroscopy.      Medications     lactated ringers 125 mL/hr at 09/15/21 0854     - MEDICATION INSTRUCTIONS -       - MEDICATION INSTRUCTIONS -       - MEDICATION INSTRUCTIONS -       oxytocin in 0.9% NaCl       oxytocin in 0.9% NaCl         docusate sodium  100 mg Oral Daily     insulin aspart  1-3 Units Subcutaneous TID AC     insulin aspart  1-3 Units Subcutaneous At Bedtime       Physical Exam   Temp: 98.1  F (36.7  C) Temp src: Oral BP: 93/61 Pulse: 71   Resp: 18 SpO2: 98 %      Vitals:    09/15/21 0914   Weight: 64.4 kg (142 lb)     Vital Signs with Ranges  Temp:  [98  F (36.7  C)-98.1  F (36.7  C)] 98.1  F (36.7  C)  Pulse:  [66-72] 71  Resp:  [16-18] 18  BP: ()/() 93/61  SpO2:  [86 %-100 %] 98 %  I/O last 3 completed shifts:  In: -   Out: 347 [Blood:347]    Uterine fundus is firm, non-tender and at the level of the umbilicus  Extremities Non-tender    Data   Recent Labs   Lab Test 09/15/21  0822   AS Negative     Recent Labs   Lab Test 09/16/21  0651 09/15/21  1756   HGB 10.8* 11.7     Recent Labs   Lab Test 03/08/21  1406   RUQIGG Positive

## 2021-09-16 NOTE — PLAN OF CARE
Problem: Adult Inpatient Plan of Care  Goal: Plan of Care Review  Outcome: Improving     Problem: Adjustment to Role Transition (Postpartum Vaginal Delivery)  Goal: Successful Maternal Role Transition  Outcome: Improving     Problem: Bleeding (Postpartum Vaginal Delivery)  Goal: Hemostasis  Outcome: Improving     Problem: Infection (Postpartum Vaginal Delivery)  Goal: Absence of Infection Signs and Symptoms  Outcome: Improving     Problem: Pain (Postpartum Vaginal Delivery)  Goal: Acceptable Pain Control  Outcome: Improving  Intervention: Prevent or Manage Pain  Recent Flowsheet Documentation  Taken 9/16/2021 0048 by Kavita Woodard, RN  Pain Management Interventions: medication (see MAR)     Problem: Urinary Retention (Postpartum Vaginal Delivery)  Goal: Effective Urinary Elimination  Outcome: Improving   Pt VSS. RN assisted pt to the bathroom d/t legs being a little numb the last time she got up. Pt c/o pain in her back that radiates up her neck and is causing a headache. Pt spoke with anesthesia on evening shift. Pt taking ibuprofen and Norco to help relieve pain. Pt states that it has helped a little bit. Attempted to do a heating pad but unable to get one from stores. Pt is breastfeeding independently. Pt fundus is firm and bleeding is scant. Will continue to monitor.

## 2021-09-16 NOTE — LACTATION NOTE
This note was copied from a baby's chart.  This writer met with Selena to offer lactation support.  Infant is at testing and Selena is resting, as she has not been feeling good today.  She states infant has been having donor milk.   Education provided that the breast should have extra stimulation (pump) for every supplement infant receives.  Selena agreed to use the hospital grade breast pump.  Education provided on the use and cleaning of the hospital grade breast pump on the Initiate program.  Selena verbalizes understanding of all education given.  She denies any further questions.

## 2021-09-16 NOTE — PLAN OF CARE
Problem: Adjustment to Role Transition (Postpartum Vaginal Delivery)  Goal: Successful Maternal Role Transition  Outcome: Improving     Problem: Bleeding (Postpartum Vaginal Delivery)  Goal: Hemostasis  Outcome: Improving     Problem: Infection (Postpartum Vaginal Delivery)  Goal: Absence of Infection Signs and Symptoms  Outcome: Improving     Problem: Urinary Retention (Postpartum Vaginal Delivery)  Goal: Effective Urinary Elimination  Outcome: Improving     Problem: Pain (Postpartum Vaginal Delivery)  Goal: Acceptable Pain Control  9/16/2021 0938 by Cierra Milligan RN  Outcome: Declining  9/16/2021 0937 by Cierra Milligan, RN  Outcome: Improving       Patients VSS, patient is having difficulty from having Epidural and Intrathecal placed yesterday.   She is having a blood patch placed later today.   Will continue to monitor.

## 2021-09-16 NOTE — ANESTHESIA POSTPROCEDURE EVALUATION
Patient: Selena Lui    * No procedures listed *    Diagnosis:* No pre-op diagnosis entered *  Diagnosis Additional Information: No value filed.    Anesthesia Type:  No value filed.    Note:     Postop Pain Control: Uneventful            Sign Out: Well controlled pain   PONV: No   Neuro/Psych: Uneventful            Sign Out: Acceptable/Baseline neuro status   Airway/Respiratory: Uneventful            Sign Out: Acceptable/Baseline resp. status   CV/Hemodynamics: Uneventful            Sign Out: Acceptable CV status; No obvious hypovolemia; No obvious fluid overload   Other NRE: NONE   DID A NON-ROUTINE EVENT OCCUR? No    Event details/Postop Comments:  The patient delivered at 3pm this afternoon. Called to room at  830pm for pain when sitting up. Epidural was challenging in the hands of an expert anesthesiologist. Pain is between shoulders, and is position dependent (worse when sitting up 6-7/10). No fever or chills. The patient's left leg is still numb and a little weak from the epidural.    Discussed options with patient. The shoulder pain could be from the epidural infusion or positioning during pregnancy, but I think this is very unlikely. Furthermore, [facility] who agrees that symptoms 5 hours after a puncture is very uncommon. Despite these arguments, I feel the patient does have risk factors and early signs of a PDPH and will likely require a blood patch. According to the expert, success rates of blood patches are higher if one waits overnight. If blood patch is pursued, I think this is one of those instances where the patient would do better if she had a blood patch done under fluoroscopy given the challenges of the original epidural placement.    The plan is to follow the patient overnight. The nurse will give the patient additional pain meds as needed. Tomorrow, we can reassess, and see if symptoms are better or worse. If worse, I would probably have the patient go  to fluoroscopy to have the blood patch.           Last vitals:  Vitals Value Taken Time   /82 09/15/21 2039   Temp 36.7  C (98.1  F) 09/15/21 2039   Pulse 72 09/15/21 2039   Resp 18 09/15/21 2039   SpO2 99 % 09/15/21 2039       Electronically Signed By: Yandel Best MD  September 15, 2021  8:40 PM

## 2021-09-16 NOTE — PROCEDURES
[facility] Post Procedure    Procedure: Epidural blood patch    Radiologist: Nate Turcios MD    Fluoro Time: .8 minutes  Air Kerma: 50 mGy    EBL: minimal  Complications: none    Preliminary findings: (see dictation for full detail)  Epidural blood patch performed at the L3-4 level. This is the level of skin puncture from recent epidural. 15 ml of patient's own blood infused at this level.    Assess/Plan:   Bedrest for 1 hour    Nate Turcios MD  Pager: [phone]  Emergency pager: [phone]  Office: [phone]

## 2021-09-16 NOTE — PROGRESS NOTES
36-year-old G3, P2 at 38 weeks gestation with pregnancy complicated by type 2 diabetes. We reviewed her recent ultrasound results which showed underlying growth restriction. This is concerning given her recent low blood sugar values and difficult to control blood sugars. We discussed induction. We discussed doing induction at 38 weeks which is tomorrow. She and her  are present for the discussion. We discussed risks including increased risk for  stressed to mom and baby and she has agreed to induction. She is a favorable cervix and so Pitocin induction is the plan. All questions are answered and we will plan for induction tomorrow.

## 2021-09-16 NOTE — PLAN OF CARE
Problem: Bleeding (Postpartum Vaginal Delivery)  Goal: Hemostasis  Outcome: Improving     Problem: Urinary Retention (Postpartum Vaginal Delivery)  Goal: Effective Urinary Elimination  Outcome: Improving     Pt assisted to bathroom with assist of one and tolerated well. Pt continue to complain of pain worsening when sitting upright. MDA came to see patient and have a plan in place with patient. See MD note.

## 2021-09-17 ENCOUNTER — LACTATION ENCOUNTER (OUTPATIENT)
Age: 36
End: 2021-09-17

## 2021-09-17 VITALS
BODY MASS INDEX: 27.88 KG/M2 | HEART RATE: 77 BPM | SYSTOLIC BLOOD PRESSURE: 101 MMHG | OXYGEN SATURATION: 100 % | DIASTOLIC BLOOD PRESSURE: 81 MMHG | HEIGHT: 60 IN | RESPIRATION RATE: 16 BRPM | TEMPERATURE: 97.9 F | WEIGHT: 142 LBS

## 2021-09-17 LAB — GLUCOSE BLDC GLUCOMTR-MCNC: 89 MG/DL (ref 70–99)

## 2021-09-17 PROCEDURE — 250N000013 HC RX MED GY IP 250 OP 250 PS 637: Performed by: FAMILY MEDICINE

## 2021-09-17 RX ORDER — IBUPROFEN 800 MG/1
800 TABLET, FILM COATED ORAL EVERY 6 HOURS PRN
Qty: 30 TABLET | Refills: 0 | Status: SHIPPED | OUTPATIENT
Start: 2021-09-17 | End: 2021-09-27

## 2021-09-17 RX ADMIN — DOCUSATE SODIUM 100 MG: 100 CAPSULE, LIQUID FILLED ORAL at 07:47

## 2021-09-17 RX ADMIN — IBUPROFEN 800 MG: 800 TABLET, FILM COATED ORAL at 07:48

## 2021-09-17 RX ADMIN — IBUPROFEN 800 MG: 800 TABLET, FILM COATED ORAL at 01:16

## 2021-09-17 NOTE — ANESTHESIA POSTPROCEDURE EVALUATION
Patient: Selena Lui    * No procedures listed *    Diagnosis:* No pre-op diagnosis entered *  Diagnosis Additional Information: No value filed.    Anesthesia Type:  Epidural    Note:  Disposition: Inpatient   Postop Pain Control: Uneventful            Sign Out: Well controlled pain   PONV: No   Neuro/Psych: Uneventful            Sign Out: Acceptable/Baseline neuro status   Airway/Respiratory: Uneventful            Sign Out: Acceptable/Baseline resp. status   CV/Hemodynamics: Uneventful            Sign Out: Acceptable CV status; No obvious hypovolemia; No obvious fluid overload   Other NRE:    DID A NON-ROUTINE EVENT OCCUR?     Event details/Postop Comments:  PDPH   pt received epidural blood patch today.Headache has improved after treatment.           Last vitals:  Vitals Value Taken Time   BP     Temp     Pulse     Resp     SpO2         Electronically Signed By: Ghassan Bergeron MD  September 16, 2021  9:29 PM

## 2021-09-17 NOTE — LACTATION NOTE
This note was copied from a baby's chart.  This writer met with Selena prior to discharge to offer breastfeeding support and education.  She states infant is latching well and just waiting for RN to sell her a standard Medela breast pump.  She denies any further questions at this time.  She has the phone number to outpatient lactation clinic prn.

## 2021-09-17 NOTE — PLAN OF CARE
Problem: Adult Inpatient Plan of Care  Goal: Optimal Comfort and Wellbeing  Outcome: Improving   Selena is feeling much better tonight after her blood patch today, states HA is gone. Minimal cramps.

## 2021-09-17 NOTE — PROGRESS NOTES
Outreach Note for LADONNA Lui  [id number]  1985    Chart reviewed, discharge follow-up plan discussed with MD and patient's bedside RN, needs assessed. Postpartum follow-up appointment with  planned in 6 weeks at the [facility]. Patient is reported to have support at home, has baby care essentials, and feels ready to discharge today with .    Outreach nurse will continue to follow and assist with discharge planning as needed. No additional discharge needs reported at this time.

## 2021-09-19 ENCOUNTER — HOSPITAL ENCOUNTER (EMERGENCY)
Facility: HOSPITAL | Age: 36
Discharge: HOME | End: 2021-09-20
Attending: EMERGENCY MEDICINE | Admitting: EMERGENCY MEDICINE
Payer: COMMERCIAL

## 2021-09-19 DIAGNOSIS — G97.1 POST LUMBAR PUNCTURE HEADACHE: ICD-10-CM

## 2021-09-19 LAB
BASOPHILS # BLD AUTO: 0 10E3/UL (ref 0–0.2)
BASOPHILS NFR BLD AUTO: 0 %
EOSINOPHIL # BLD AUTO: 0.1 10E3/UL (ref 0–0.7)
EOSINOPHIL NFR BLD AUTO: 1 %
ERYTHROCYTE [DISTWIDTH] IN BLOOD BY AUTOMATED COUNT: 12.6 % (ref 10–15)
HCT VFR BLD AUTO: 38.5 % (ref 35–47)
HGB BLD-MCNC: 12.6 G/DL (ref 11.7–15.7)
IMM GRANULOCYTES # BLD: 0 10E3/UL
IMM GRANULOCYTES NFR BLD: 0 %
INR PPP: 0.93 (ref 0.85–1.15)
LYMPHOCYTES # BLD AUTO: 2.4 10E3/UL (ref 0.8–5.3)
LYMPHOCYTES NFR BLD AUTO: 23 %
MCH RBC QN AUTO: 29 PG (ref 26.5–33)
MCHC RBC AUTO-ENTMCNC: 32.7 G/DL (ref 31.5–36.5)
MCV RBC AUTO: 89 FL (ref 78–100)
MONOCYTES # BLD AUTO: 1 10E3/UL (ref 0–1.3)
MONOCYTES NFR BLD AUTO: 9 %
NEUTROPHILS # BLD AUTO: 7.2 10E3/UL (ref 1.6–8.3)
NEUTROPHILS NFR BLD AUTO: 67 %
NRBC # BLD AUTO: 0 10E3/UL
NRBC BLD AUTO-RTO: 0 /100
PLATELET # BLD AUTO: 339 10E3/UL (ref 150–450)
RBC # BLD AUTO: 4.35 10E6/UL (ref 3.8–5.2)
WBC # BLD AUTO: 10.6 10E3/UL (ref 4–11)

## 2021-09-19 PROCEDURE — 258N000003 HC RX IP 258 OP 636: Performed by: EMERGENCY MEDICINE

## 2021-09-19 PROCEDURE — 99285 EMERGENCY DEPT VISIT HI MDM: CPT | Mod: 25

## 2021-09-19 PROCEDURE — 85730 THROMBOPLASTIN TIME PARTIAL: CPT | Performed by: EMERGENCY MEDICINE

## 2021-09-19 PROCEDURE — 85610 PROTHROMBIN TIME: CPT | Performed by: EMERGENCY MEDICINE

## 2021-09-19 PROCEDURE — 85025 COMPLETE CBC W/AUTO DIFF WBC: CPT | Performed by: EMERGENCY MEDICINE

## 2021-09-19 PROCEDURE — 36415 COLL VENOUS BLD VENIPUNCTURE: CPT | Performed by: EMERGENCY MEDICINE

## 2021-09-19 RX ORDER — SODIUM CHLORIDE 9 MG/ML
INJECTION, SOLUTION INTRAVENOUS CONTINUOUS
Status: DISCONTINUED | OUTPATIENT
Start: 2021-09-19 | End: 2021-09-20 | Stop reason: HOSPADM

## 2021-09-19 RX ORDER — MORPHINE SULFATE 4 MG/ML
4 INJECTION, SOLUTION INTRAMUSCULAR; INTRAVENOUS ONCE
Status: COMPLETED | OUTPATIENT
Start: 2021-09-19 | End: 2021-09-20

## 2021-09-19 RX ORDER — ONDANSETRON 2 MG/ML
4 INJECTION INTRAMUSCULAR; INTRAVENOUS ONCE
Status: DISCONTINUED | OUTPATIENT
Start: 2021-09-19 | End: 2021-09-20 | Stop reason: HOSPADM

## 2021-09-19 RX ADMIN — SODIUM CHLORIDE 1000 ML: 9 INJECTION, SOLUTION INTRAVENOUS at 22:27

## 2021-09-19 RX ADMIN — SODIUM CHLORIDE: 9 INJECTION, SOLUTION INTRAVENOUS at 22:33

## 2021-09-19 ASSESSMENT — ENCOUNTER SYMPTOMS
VOMITING: 1
NECK STIFFNESS: 1
NAUSEA: 1
NECK PAIN: 1
HEADACHES: 1
ABDOMINAL PAIN: 0

## 2021-09-20 ENCOUNTER — APPOINTMENT (OUTPATIENT)
Dept: INTERVENTIONAL RADIOLOGY/VASCULAR | Facility: HOSPITAL | Age: 36
End: 2021-09-20
Attending: NURSE PRACTITIONER
Payer: COMMERCIAL

## 2021-09-20 VITALS
DIASTOLIC BLOOD PRESSURE: 76 MMHG | RESPIRATION RATE: 18 BRPM | BODY MASS INDEX: 26.76 KG/M2 | WEIGHT: 137 LBS | TEMPERATURE: 98.1 F | HEART RATE: 73 BPM | OXYGEN SATURATION: 100 % | SYSTOLIC BLOOD PRESSURE: 122 MMHG

## 2021-09-20 LAB — APTT PPP: 27 SECONDS (ref 22–38)

## 2021-09-20 PROCEDURE — 258N000003 HC RX IP 258 OP 636: Performed by: EMERGENCY MEDICINE

## 2021-09-20 PROCEDURE — 250N000011 HC RX IP 250 OP 636: Performed by: EMERGENCY MEDICINE

## 2021-09-20 PROCEDURE — 77003 FLUOROGUIDE FOR SPINE INJECT: CPT

## 2021-09-20 PROCEDURE — 96374 THER/PROPH/DIAG INJ IV PUSH: CPT

## 2021-09-20 PROCEDURE — 255N000002 HC RX 255 OP 636: Performed by: RADIOLOGY

## 2021-09-20 RX ADMIN — IOHEXOL 1 ML: 180 INJECTION INTRAVENOUS at 09:43

## 2021-09-20 RX ADMIN — SODIUM CHLORIDE: 9 INJECTION, SOLUTION INTRAVENOUS at 06:18

## 2021-09-20 RX ADMIN — MORPHINE SULFATE 4 MG: 4 INJECTION INTRAVENOUS at 04:06

## 2021-09-20 NOTE — PROCEDURES
Interventional Neuroradiology Post Procedure    Patient Name: Selena Lui  MRN: [id number]  Date of Procedure: September 20, 2021    Procedure: Lumbar epidural blood patch  Radiologist: Tyler Royal MD    Contrast: 1 ml Omni 180  Fluoro Time: 0.7 minutes  Air Kerma: 12.79 mGy  Medications: none  EBL: min  Complications: none    Patient reevaluated immediately prior to sedation and prior to procedure.    Preliminary Report:   (See dictation for full detail)  1. 20 ml peripheral blood instilled into dorsal epidural space at L2-3    Assess/Plan:  Bedrest x 1 hr  Continue hydration    Tyler Royal MD   Emergency pager: [phone]  Office: [phone]

## 2021-09-20 NOTE — PROGRESS NOTES
"Called to evaluate patient due to possible spinal headache. Patient recently delivered on 9/15 here at [facility] and requested epidural placement for labor analgesia. Epidural catheter placement was attempted by an experienced anesthesiologist and per patient and documentation the placement was quite difficult. Initial epidural catheter placement did not result in satisfactory analgesia and a repeat attempt was made. The subsequent attempt resulted in an intra-thecal catheter placement which was used for analgesia and the patient subsequently delivered. She developed symptoms consistent with post dural puncture headache and was evaluated for a possible blood patch procedure. Given the difficulty of epidural placement, it was recommended that a blood patch be performed by radiology with fluoroscopy. She underwent blood patch placement with radiology with fluoroscopic guidance on 9/16 and noted resolution of her symptoms. She presents this evening with recurrence of her headache.     She states that she initially had resolution of her symptoms however her headache has recurred. Her headache is positional occurring when sitting or standing upright and relieved by lying supine. She describes it as severe pain \"20 out of 10\" located in her head and neck and causing difficulty turning her head and neck when upright. She denies photophobia, sensitivity to sound, or tinnitus. She denies any fever or chills and her WBC is 10.6. She denies nausea or vomiting and states she has been able to keep up her oral intake.     I discussed that her symptoms are consistent with post dural puncture headache and may require a repeat blood patch. Given the difficulty of her initial epidural placement her initial blood patch was performed by radiology and I recommend this for her repeat as well. I discussed this with the patient and her significant other and they state understanding and agreement to this. In terms of symptom control I " recommended rest and hydration. Caffeine 300-500mg per day can help with symptoms and the patient states she has been drinking a significant amount of coffee and this has been helpful. I discussed my recommendation for a blood patch performed by radiology with Dr. Anderson. If there are any questions the anesthesiologist on call can be reached at [phone].

## 2021-09-20 NOTE — ED TRIAGE NOTES
Had an spinal fluid leak from and epidural during labor. 9/15/21. Received a blood patch on 9/16/21. Reports stiff neck, pain in back and headache.

## 2021-09-20 NOTE — ED PROVIDER NOTES
Emergency Department Encounter      NAME: Selena Lui  AGE: 36 year old female  YOB: 1985  MRN: [id number]  EVALUATION DATE & TIME: 2021  9:48 PM    PCP: No Ref-Primary, Physician    ED PROVIDER: Harvinder Anderson M.D.      Chief Complaint   Patient presents with     Headache     Pain     post procedure pain         FINAL IMPRESSION:  1. Post lumbar puncture headache        MEDICAL DECISION MAKIN:54 PM I met with the patient, obtained history, performed an initial exam, and discussed options and plan for diagnostics and treatment here in the ED.   10:08 PM Spoke with anesthesiologist, Dr. Mota  10:28 PM Spoke with Dr. Mota over availability for performing blood patch on patient.   10:34 PM Met with patient to discuss blood patch availability. Patient will be boarding here overnight until her blood patch in the morning.   10:47 PM Spoke with Dr. Turcios, neuro. He has put bed 11 on the schedule for the morning around 8 AM for the blood patch.   This patient is a 36-year-old female who presents with a headache.  She says that she delivered her baby on 15 September.  She had had a epidural placed initially which only numbed her left leg, this was removed and a second 1 was attempted but turned into a intrathecal block.  The next day she began to have a headache.  A blood patch was done on the  and she was discharged on .  She says later on on the  she began to get a headache when she would stand up or sit up.  She says that her neck feels stiff and painful during this time and the pain is felt in the back of her head on top of her head.  It is relieved with laying flat.  She does not have any fevers or chills.  No nausea or vomiting.  No change in vision, speech or balance.  No numbness weakness or incoordination of the arms or legs.  No bladder or bowel incontinence.  No saddle anesthesia.  I ordered a CBC and coags for the patient as well as IV fluids and pain  medication.  The patient was comfortable laying flat and so she did not want any pain medication or nausea medicine.  I spoke with her anesthesiologist who reviewed the patient's chart and examined the patient.  He stated that the patient's blood patch would need to be done by IR under fluoroscopy like her initial 1 on 16 September.  I spoke with Dr. Turcios who is on for neuro IR.  He put the patient on the schedule for the morning.  There are no beds available at the hospital and so she will be boarded in the ER until morning when she will be brought to the IR suite to get the blood patch done.    Pertinent Labs & Imaging studies reviewed. (See chart for details)           The importance of close follow up was discussed. We reviewed warning signs and symptoms, and I instructed Ms. Lui to return to the emergency department immediately if she develops any new or worsening symptoms. I provided additional verbal discharge instructions. Ms. Lui expressed understanding and agreement with this plan of care, her questions were answered, and she was discharged in stable condition.           MEDICATIONS GIVEN IN THE EMERGENCY:  Medications   0.9% sodium chloride BOLUS (0 mLs Intravenous Stopped 9/19/21 2233)     Followed by   sodium chloride 0.9% infusion ( Intravenous New Bag 9/19/21 2233)   ondansetron (ZOFRAN) injection 4 mg (has no administration in time range)   morphine (PF) injection 4 mg (has no administration in time range)       NEW PRESCRIPTIONS STARTED AT TODAY'S ER VISIT:  New Prescriptions    No medications on file          =================================================================    HPI    Patient information was obtained from: Patient    Use of : N/A         Selena Lui is a 36 year old female with a past medical history of DM II who presents to the Emergency Department for the evaluation of stiffness in the back and neck as well as headache.    Per chart review, patient was seen 9/15/21 at  "[facility] for delivery. Patient was given an epidural which only number her left leg. The epidural was taken out and patient was given a intrathecal. Patient delivered her baby 9/15/21. Anesthesiology was called to the room at 8:30 PM for the evaluation of pain when sitting up. Patient was believed to display early signs of PDPH and was expected to require a blood patch. Patient was given a blood patch on 9/16/21. It was noted her headache began to improve after the treatment.     Patient notes that immediately after delivery she began to feel the symptoms of post dural puncture headache. She described pain in her neck and shoulders along with stiffness and a headache. She notes she threw up after the anesthesia. Patient notes that symptoms are palliated by laying down flat saying she feels \"instant\" relief. After the blood patch she received patient notes she felt relief one hour after the procedure until the symptoms began to return at around 4-5 PM. She states her neck is stiff and is painful to turn. She has been on bed rest the whole time. She endorses feeling \"hot\" but has no fever. She endorses her arms and legs feel \"good\". Patient bends her neck \"fine\" when laying down. She notes pounding pain when taking big steps as well as with coughing. Patient was given Norco in the hospital for pain and is taking ibuprofen currently but notes it does not help when she is upright. She denies any abdominal pain, vision changes, or nausea and vomiting since first episode. Patient denies any other complaints at the time.       REVIEW OF SYSTEMS   Review of Systems   Gastrointestinal: Positive for nausea (since resolved) and vomiting (x1). Negative for abdominal pain.   Musculoskeletal: Positive for neck pain and neck stiffness.   Neurological: Positive for headaches.   All other systems reviewed and are negative.       PAST MEDICAL HISTORY:  Past Medical History:   Diagnosis Date     ASCUS of " cervix with negative high risk HPV 7/28/2015 11/8/11 NIL 7/28/15 ASCUS, neg HPV 9/1/15 NIL 3/8/21 NIL pap, neg HPV. Routine screening     Diabetes (H) 10/18/2016     Tears of meniscus and ACL of left knee 10/18/2016       PAST SURGICAL HISTORY:  Past Surgical History:   Procedure Laterality Date     ANTERIOR CRUCIATE LIGAMENT REPAIR Left     2010     IR BLOOD PATCH  9/16/2021       CURRENT MEDICATIONS:      Current Facility-Administered Medications:      morphine (PF) injection 4 mg, 4 mg, Intravenous, Once, Harvinder Anderson MD     ondansetron (ZOFRAN) injection 4 mg, 4 mg, Intravenous, Once, Harvinder Anderson MD     [COMPLETED] 0.9% sodium chloride BOLUS, 1,000 mL, Intravenous, Once, Stopped at 09/19/21 2233 **FOLLOWED BY** sodium chloride 0.9% infusion, , Intravenous, Continuous, Harvinder Anderson MD, Last Rate: 125 mL/hr at 09/19/21 2233, New Bag at 09/19/21 2233    Current Outpatient Medications:      Continuous Blood Gluc Sensor (FREESTYLE JAGDISH 2 SENSOR) MISC, 1 Application every 14 days, Disp: 4 each, Rfl: 3     ibuprofen (ADVIL/MOTRIN) 800 MG tablet, Take 1 tablet (800 mg) by mouth every 6 hours as needed for other (cramping), Disp: 30 tablet, Rfl: 0    ALLERGIES:  No Known Allergies    FAMILY HISTORY:  Family History   Problem Relation Age of Onset     No Known Problems Mother      No Known Problems Father        SOCIAL HISTORY:   Social History     Socioeconomic History     Marital status: Single     Spouse name: Not on file     Number of children: Not on file     Years of education: Not on file     Highest education level: Not on file   Occupational History     Not on file   Tobacco Use     Smoking status: Never Smoker     Smokeless tobacco: Never Used   Substance and Sexual Activity     Alcohol use: Never     Drug use: Never     Sexual activity: Yes   Other Topics Concern     Not on file   Social History Narrative     Not on file     Social Determinants of Health     Financial Resource Strain:       Difficulty of Paying Living Expenses:    Food Insecurity:      Worried About Running Out of Food in the Last Year:      Ran Out of Food in the Last Year:    Transportation Needs:      Lack of Transportation (Medical):      Lack of Transportation (Non-Medical):    Physical Activity:      Days of Exercise per Week:      Minutes of Exercise per Session:    Stress:      Feeling of Stress :    Social Connections:      Frequency of Communication with Friends and Family:      Frequency of Social Gatherings with Friends and Family:      Attends [employer] Services:      Active Member of Clubs or Organizations:      Attends Club or Organization Meetings:      Marital Status:    Intimate Partner Violence:      Fear of Current or Ex-Partner:      Emotionally Abused:      Physically Abused:      Sexually Abused:        PHYSICAL EXAM:    Vitals: /87   Pulse 88   Temp 98.1  F (36.7  C) (Temporal)   Resp 18   Wt 62.1 kg (137 lb)   LMP 12/16/2020   SpO2 100%   BMI 26.76 kg/m     Constitutional: Well developed, well nourished.  female lying flat comfortably.   HENT: Normocephalic, atraumatic, mucous membranes moist, nose normal.   Neck- Supple, gross ROM intact.   Eyes: Pupils mid-range, sclera white, no discharge  Respiratory: Clear to auscultation bilaterally, no respiratory distress, no wheezing, speaks full sentences easily.  Cardiovascular: Normal heart rate, regular rhythm, no murmurs. No lower extremity edema, 2+ DP pulses.   GI: Soft, no tenderness to deep palpation in all quadrants, no masses.  Musculoskeletal: Moving all 4 extremities intentionally and without pain. No obvious deformity.  Skin: Warm, dry, no rash. 2 puncture sites on right lumbar spine.   Neurologic: Alert & oriented x 3, speech clear, moving all extremities spontaneously   Psychiatric: Affect normal, cooperative.    LAB:  All pertinent labs reviewed and interpreted.  Labs Ordered and Resulted from Time of ED Arrival Up to the Time of  Departure from the ED   INR - Normal   CBC WITH PLATELETS AND DIFFERENTIAL   PARTIAL THROMBOPLASTIN TIME   CALL   PERIPHERAL IV CATHETER   CALL   CBC WITH PLATELETS & DIFFERENTIAL    Narrative:     The following orders were created for panel order CBC with platelets differential.  Procedure                               Abnormality         Status                     ---------                               -----------         ------                     CBC with platelets and d...[[id number]]                      Final result                 Please view results for these tests on the individual orders.     I, Pratima Carcamo, am serving as a scribe to document services personally performed by Dr. Harvinder Anderson based on my observation and the provider's statements to me. I, Harvinder Anderson M.D. attest that Pratima Carcamo is acting in a scribe capacity, has observed my performance of the services and has documented them in accordance with my direction.      Harvinder Anderson M.D.  Emergency Medicine  [facility] EMERGENCY DEPARTMENT  [address] [place] [id number] [phone]  Dept: [phone]     Harvinder Anderson MD  09/19/21 6901

## 2021-09-20 NOTE — DISCHARGE INSTRUCTIONS
Please continue supportive cares at home including drinking 2 to 3 L of water every day, taking Tylenol and ibuprofen every 6 hours.  Continue to work with your primary doctor next week regarding any ongoing headache concerns.  If you have any fevers, intractable vomiting, confusion, or other immediate concern we should reevaluate in the ER

## 2021-09-20 NOTE — ED PROVIDER NOTES
ED SIGNOUT  Date/Time:9/20/2021 7:27 AM    Patient signed out to me by my colleague, Dr Garcia. Please see their note for complete history and physical. Plan for blood patch with IR at 0800 today.     The creation of this record is based on the scribe s observations of the work being performed by Dr Kina MD and the provider s statements to them. It was created on their behalf by Dahlia Ramirez a trained medical scribe. This document has been checked and approved by the attending provider.      REMAINING ED WORKUP:    Vitals:  /81   Pulse 69   Temp 98.1  F (36.7  C) (Temporal)   Resp 18   Wt 62.1 kg (137 lb)   LMP 12/16/2020   SpO2 99%   BMI 26.76 kg/m      Pertinent labs results reviewed   Results for orders placed or performed during the hospital encounter of 09/19/21   Result Value Ref Range    INR 0.93 0.85 - 1.15   CBC with platelets and differential   Result Value Ref Range    WBC Count 10.6 4.0 - 11.0 10e3/uL    RBC Count 4.35 3.80 - 5.20 10e6/uL    Hemoglobin 12.6 11.7 - 15.7 g/dL    Hematocrit 38.5 35.0 - 47.0 %    MCV 89 78 - 100 fL    MCH 29.0 26.5 - 33.0 pg    MCHC 32.7 31.5 - 36.5 g/dL    RDW 12.6 10.0 - 15.0 %    Platelet Count 339 150 - 450 10e3/uL    % Neutrophils 67 %    % Lymphocytes 23 %    % Monocytes 9 %    % Eosinophils 1 %    % Basophils 0 %    % Immature Granulocytes 0 %    NRBCs per 100 WBC 0 <1 /100    Absolute Neutrophils 7.2 1.6 - 8.3 10e3/uL    Absolute Lymphocytes 2.4 0.8 - 5.3 10e3/uL    Absolute Monocytes 1.0 0.0 - 1.3 10e3/uL    Absolute Eosinophils 0.1 0.0 - 0.7 10e3/uL    Absolute Basophils 0.0 0.0 - 0.2 10e3/uL    Absolute Immature Granulocytes 0.0 <=0.0 10e3/uL    Absolute NRBCs 0.0 10e3/uL   Partial thromboplastin time   Result Value Ref Range    aPTT 27 22 - 38 Seconds     Pertinent imaging reviewed   Please see official radiology report.  IR Blood Patch    (Results Pending)      Interventions  Medications   0.9% sodium chloride BOLUS (0 mLs Intravenous Stopped  9/19/21 5486)     Followed by   sodium chloride 0.9% infusion (0 mLs Intravenous Stopped 9/20/21 1103)   ondansetron (ZOFRAN) injection 4 mg (has no administration in time range)   morphine (PF) injection 4 mg (4 mg Intravenous Given 9/20/21 0406)   iohexol (OMNIPAQUE) 180 mg/mL injection 20 mL (1 mL Intrathecal Given 9/20/21 1241)        ED Course/MDM:  7:00 AM Signout accepted from Dr Garcia. Prior records were reviewed. Diagnostics from this visit are reviewed.  8:11 AM I introduced myself to the patient. IR currently placing blood patch.   10:51 AM I discussed the plan for discharge with the patient, and patient is agreeable. We discussed supportive cares at home and reasons for return to the ER including new or worsening symptoms - all questions and concerns addressed. Patient to be discharged by RN.     Patient is a 36-year-old female who is 5 days postpartum from delivery with epidural anesthesia.  She presents with a positional headache.  Evaluated by previous provider and signed out to me pending follow-up on IR blood patch this morning.  Patient was laid flat after her procedure for an hour and observed.  She was able to get up and sit up without any significant recurrent headache.  She was feeling better and was comfortable with the plan to discharge home.  We discussed additional supportive cares at home.         1. Post lumbar puncture headache        Tyler Castanon M.D.  Emergency Medicine  [facility] EMERGENCY DEPARTMENT  [address] [place] [id number] [phone]  Dept: [phone]         Tyler Castanon MD  09/20/21 8706

## 2021-09-20 NOTE — ED PROVIDER NOTES
36-year-old female with recent epidural presenting for persisting headache worsened with orthostatics consistent with persisting post LP headache despite blood patch performed previously.  Patient evaluated by my colleague and signed out to this provider pending a plan for blood patch with radiology in the morning at 8 AM.    Vitals:    09/19/21 2135   BP: 136/87   Pulse: 88   Resp: 18   Temp: 98.1  F (36.7  C)   TempSrc: Temporal   SpO2: 100%   Weight: 62.1 kg (137 lb)       Results for orders placed or performed during the hospital encounter of 09/19/21   Result Value Ref Range    INR 0.93 0.85 - 1.15   CBC with platelets and differential   Result Value Ref Range    WBC Count 10.6 4.0 - 11.0 10e3/uL    RBC Count 4.35 3.80 - 5.20 10e6/uL    Hemoglobin 12.6 11.7 - 15.7 g/dL    Hematocrit 38.5 35.0 - 47.0 %    MCV 89 78 - 100 fL    MCH 29.0 26.5 - 33.0 pg    MCHC 32.7 31.5 - 36.5 g/dL    RDW 12.6 10.0 - 15.0 %    Platelet Count 339 150 - 450 10e3/uL    % Neutrophils 67 %    % Lymphocytes 23 %    % Monocytes 9 %    % Eosinophils 1 %    % Basophils 0 %    % Immature Granulocytes 0 %    NRBCs per 100 WBC 0 <1 /100    Absolute Neutrophils 7.2 1.6 - 8.3 10e3/uL    Absolute Lymphocytes 2.4 0.8 - 5.3 10e3/uL    Absolute Monocytes 1.0 0.0 - 1.3 10e3/uL    Absolute Eosinophils 0.1 0.0 - 0.7 10e3/uL    Absolute Basophils 0.0 0.0 - 0.2 10e3/uL    Absolute Immature Granulocytes 0.0 <=0.0 10e3/uL    Absolute NRBCs 0.0 10e3/uL   Partial thromboplastin time   Result Value Ref Range    aPTT 27 22 - 38 Seconds       US OB Biophys Single Gestation Measure    Result Date: 9/13/2021  EXAM: US OB BIOPHYS SINGLE GESTATION W MEASURE LOCATION: [facility] DATE/TIME: 9/13/2021 12:26 PM INDICATION:  Type 2 diabetes mellitus with hyperglycemia, without long-term current use of insulin (H), Pregnancy, unspecified gestational age COMPARISON: 09/09/2021 FINDINGS: Single living fetus, cephalic  presentation. HEART RATE: 135   bpm. SDP 3.1  cm. PLACENTA: At the fundus. . No previa. CERVIX: 3.0 centimeters. 2/2 fetal breathing 2/2 fetal movements 2/2 fetal tone 2/2 amniotic fluid Total biophysical profile 8/8 Umbilical cord Doppler S/D ratio: 2.7 (normal is less than 3.43) BIOMETRY: Biparietal Diameter: 8.8  cm, 35 weeks 3 days Head Circumference: 32.3  cm, 36 weeks 4 days Abdominal Circumference: 30.2  cm, 34 weeks 2 days Femur Length: 6.9  cm, 35 weeks 5 days Estimated Fetal Weight: 2553  g EFW Percentile: 14  percent Cervical Length: 3.0 EDC by First US exam: 09/28/2021 EDC by This US exam: 10/14/2021 Composite Age by First US: 37 weeks 6 days  Composite Age by This US: 35 weeks 4 days     IMPRESSION: 1.  Normal 8/8 biophysical profile. 2.  Single living intrauterine gestation. 3.  Based on first  ultrasound, composite age of 37 weeks 6 days  with EDC 09/20/2021 . 4.  Abdominal circumference is less than the 2nd percentile. Otherwise measurements are near the lower end of normal. 5.  Normal umbilical cord Doppler evaluation. REFERENCE: JUAN Mcfarlane, 2005. Normal umbilical artery S/D ratio based on gestational age: <3.43    US OB Biophys Single Gestation Measure    Result Date: 9/9/2021  EXAM: US OB BIOPHYS SINGLE GESTATION W MEASURE LOCATION: [facility] DATE/TIME: 9/9/2021 4:29 PM INDICATION:  Type 2 diabetes mellitus with hyperglycemia, without long-term current use of insulin (H), Pregnancy, unspecified gestational age COMPARISON: Studies dating back to 02/01/2021 FINDINGS: Single living fetus, cephalic presentation. HEART RATE: 152 bpm. SDP 3.4 cm. PLACENTA: Anterior. No previa. CERVIX: Obscured from fetal head position. CORD DOPPLER: S/D ratio: 2.7. RI: N/A. 2/2 fetal breathing 2/2 fetal movements 2/2 fetal tone 2/2 amniotic fluid Total biophysical profile 8/8 BIOMETRY: Biparietal Diameter: 8.9 cm, 36 weeks, 2 days Head Circumference: 32.5 cm, 36 weeks, 6 days Abdominal Circumference: 32 cm, 36 weeks, 0  days Femur Length: 6.8 cm, 35 weeks, 1 day Estimated Fetal Weight: 2774 g EFW Percentile: 37% Cervical Length: NV EDC by First US exam: 9/28/2021 EDC by This US exam: 10/6/2021 Composite Age by First US: 37 weeks, 2 days Composite Age by This US: 36 weeks, 1 day     IMPRESSION: 1.  Normal 8/8 biophysical profile. 2.  Single living intrauterine gestation. 3.  Based on this ultrasound, composite age of 36 weeks 1 day with EDC 10/06/2021. 4.  Normal interval growth.    IR Blood Patch    Result Date: 9/16/2021  FLUOROSCOPICALLY-GUIDED LUMBAR EPIDURAL BLOOD PATCH 9/16/2021 11:18 AM INDICATION: 36-year-old patient with a history of recent epidural for childbirth. The patient now has a spinal headache that has been poorly controlled despite conservative measures such as rest, hydration, and caffeine intake. Epidural blood patch requested to treat spinal headache. CONSENT: The procedure and its indications, major risks, benefits, and alternatives were discussed. Risks include, but are not limited to, pain, hemorrhage, infection, nerve damage, spinal cord damage, nontarget blood infusion into the thecal sac causing  nerve inflammation/pain, and allergic reaction. Understanding was acknowledged, and a signed informed consent was obtained. FLUOROSCOPIC TIME: 0.8 minutes AIR KERMA: 50 mGy ESTIMATED BLOOD LOSS: Minimal PROCEDURE: The patient was placed in prone position upon the fluoroscopic table. The skin of the back was prepped and draped in sterile fashion. Fluoroscopic imaging was used to count vertebrae. A numbering system is employed in which counting is from below. The L5 vertebra is partially sacralized. Skin puncture from recent epidural procedure overlies the L3-L4 level. The skin was infiltrated with 1% lidocaine. Using direct fluoroscopic visualization and loss of resistance technique, a 22-gauge Tuohy epidural needle was advanced into dorsal epidural space at the L3-L4 level. Subsequent spot image confirmed that  the needle tip is in the expected location of dorsal epidural space. 15 mL of blood obtained from the patient's IV was then infused through the needle and into the epidural space. The needle was then removed. A dressing was applied.     CONCLUSION: Technically successful epidural blood patch.       ICD-10-CM    1. Post lumbar puncture headache  G97.1           Santhosh Garcia MD  09/20/21 0126

## 2021-09-20 NOTE — ED NOTES
Pt presents Postpartum back, neck pain and headache. Headache improves when lying flat, symptoms worsen when sitting up and moving neck down. Neck stiffness when sitting up. States symptoms started after epidural during labor on 9/15/21.   SKIN: WNL.   HEENT: Normocephalic, PERRL, 3mm brisk round reactive, alert and oriented x 3.   CHEST: Symmetrical rise, breath sounds are equal and clear bilaterally. No reported CP or SOB.  ABD: NTND. No reported N&V or diarrhea.  EXT: LOPEZ x 4  Rest of exam unremarkable.

## 2021-09-21 DIAGNOSIS — E11.65 TYPE 2 DIABETES MELLITUS WITH HYPERGLYCEMIA, WITHOUT LONG-TERM CURRENT USE OF INSULIN (H): Primary | ICD-10-CM

## 2021-09-25 NOTE — TELEPHONE ENCOUNTER
"Routing refill request to provider for review/approval because:  Drug not on the [facility] refill protocol   No established PCP    Last Written Prescription Date:  9/17/21  Last Fill Quantity: 30,  # refills: 0   Last office visit provider:  2/1/21     Requested Prescriptions   Pending Prescriptions Disp Refills     ibuprofen (ADVIL/MOTRIN) 800 MG tablet [Pharmacy Med Name: IBUPROFEN 800MG TABLETS] 30 tablet 0     Sig: TAKE 1 TABLET(800 MG) BY MOUTH EVERY 6 HOURS AS NEEDED FOR CRAMPING       NSAID Medications Failed - 9/25/2021  3:14 AM        Failed - No positive pregnancy test in past 12 months        Passed - Blood pressure under 140/90 in past 12 months     BP Readings from Last 3 Encounters:   09/20/21 122/76   09/17/21 101/81   09/14/21 116/72                 Passed - Normal ALT on file in past 12 months     Recent Labs   Lab Test 09/15/21  1756   ALT 11             Passed - Normal AST on file in past 12 months     Recent Labs   Lab Test 09/15/21  1756   AST 17             Passed - Recent (12 mo) or future (30 days) visit within the authorizing provider's specialty     Patient has had an office visit with the authorizing provider or a provider within the authorizing providers department within the previous 12 mos or has a future within next 30 days. See \"Patient Info\" tab in inbasket, or \"Choose Columns\" in Meds & Orders section of the refill encounter.              Passed - Patient is age 6-64 years        Passed - Normal CBC on file in past 12 months     Recent Labs   Lab Test 09/19/21  2227   WBC 10.6   RBC 4.35   HGB 12.6   HCT 38.5                    Passed - Medication is active on med list        Passed - No active pregnancy on record        Passed - Normal serum creatinine on file in past 12 months     Recent Labs   Lab Test 09/15/21  1756   CR 0.81       Ok to refill medication if creatinine is low               sherrie joel RN 09/25/21 4:39 PM  "

## 2021-09-27 RX ORDER — IBUPROFEN 800 MG/1
TABLET, FILM COATED ORAL
Qty: 30 TABLET | Refills: 0 | Status: SHIPPED | OUTPATIENT
Start: 2021-09-27 | End: 2021-11-29

## 2021-10-17 ENCOUNTER — HEALTH MAINTENANCE LETTER (OUTPATIENT)
Age: 36
End: 2021-10-17

## 2021-11-29 ENCOUNTER — PRENATAL OFFICE VISIT (OUTPATIENT)
Dept: FAMILY MEDICINE | Facility: CLINIC | Age: 36
End: 2021-11-29
Payer: COMMERCIAL

## 2021-11-29 VITALS
WEIGHT: 128.8 LBS | BODY MASS INDEX: 25.29 KG/M2 | HEIGHT: 60 IN | HEART RATE: 87 BPM | OXYGEN SATURATION: 97 % | SYSTOLIC BLOOD PRESSURE: 100 MMHG | DIASTOLIC BLOOD PRESSURE: 60 MMHG

## 2021-11-29 DIAGNOSIS — E11.65 TYPE 2 DIABETES MELLITUS WITH HYPERGLYCEMIA, WITHOUT LONG-TERM CURRENT USE OF INSULIN (H): ICD-10-CM

## 2021-11-29 LAB
HBA1C MFR BLD: 6.2 % (ref 0–5.6)
HGB BLD-MCNC: 13 G/DL (ref 11.7–15.7)

## 2021-11-29 PROCEDURE — 36415 COLL VENOUS BLD VENIPUNCTURE: CPT | Performed by: FAMILY MEDICINE

## 2021-11-29 PROCEDURE — 85018 HEMOGLOBIN: CPT | Performed by: FAMILY MEDICINE

## 2021-11-29 PROCEDURE — 99207 PR POST PARTUM EXAM: CPT | Performed by: FAMILY MEDICINE

## 2021-11-29 PROCEDURE — 83036 HEMOGLOBIN GLYCOSYLATED A1C: CPT | Performed by: FAMILY MEDICINE

## 2021-11-29 RX ORDER — LANCETS
EACH MISCELLANEOUS
Qty: 100 EACH | Refills: 6 | Status: SHIPPED | OUTPATIENT
Start: 2021-11-29 | End: 2022-10-11

## 2021-11-29 RX ORDER — GLUCOSAMINE HCL/CHONDROITIN SU 500-400 MG
CAPSULE ORAL
Qty: 100 EACH | Refills: 3 | Status: SHIPPED | OUTPATIENT
Start: 2021-11-29 | End: 2022-10-11

## 2021-11-29 ASSESSMENT — MIFFLIN-ST. JEOR: SCORE: 1194.48
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
## 2021-12-07 ENCOUNTER — OFFICE VISIT (OUTPATIENT)
Dept: FAMILY MEDICINE | Facility: CLINIC | Age: 36
End: 2021-12-07
Payer: COMMERCIAL

## 2021-12-07 VITALS
DIASTOLIC BLOOD PRESSURE: 72 MMHG | SYSTOLIC BLOOD PRESSURE: 113 MMHG | HEART RATE: 84 BPM | BODY MASS INDEX: 25.11 KG/M2 | WEIGHT: 129.6 LBS

## 2021-12-07 DIAGNOSIS — Z30.430 ENCOUNTER FOR IUD INSERTION: Primary | ICD-10-CM

## 2021-12-07 LAB — HCG UR QL: NEGATIVE

## 2021-12-07 PROCEDURE — 58300 INSERT INTRAUTERINE DEVICE: CPT | Mod: 52 | Performed by: FAMILY MEDICINE

## 2021-12-07 PROCEDURE — 81025 URINE PREGNANCY TEST: CPT | Performed by: FAMILY MEDICINE

## 2021-12-07 NOTE — PROGRESS NOTES
36-year-old female 8 weeks postpartum presenting for ParaGard IUD placement.  Informed consent was obtained.    Procedure:  Uterus was significantly retroverted  Speculum was placed with good cervical exposure.  Cervix was cleansed using Betadine.  Cervix was grasped using a tenaculum.  Uterine sound was inserted with counterpressure applied using tenaculum.  I was only able to pass the sounded to 4 cm.  Patient experienced discomfort at this placement.  Despite multiple attempts, I could not insert past 4 cm.  We stopped procedure.    Plan for referral to gynecology for ParaGard IUD placement.

## 2022-04-03 ENCOUNTER — HEALTH MAINTENANCE LETTER (OUTPATIENT)
Age: 37
End: 2022-04-03

## 2022-07-15 ENCOUNTER — HOSPITAL ENCOUNTER (EMERGENCY)
Facility: HOSPITAL | Age: 37
Discharge: HOME | End: 2022-07-15
Admitting: PHYSICIAN ASSISTANT
Payer: COMMERCIAL

## 2022-07-15 ENCOUNTER — APPOINTMENT (OUTPATIENT)
Dept: RADIOLOGY | Facility: HOSPITAL | Age: 37
End: 2022-07-15
Attending: EMERGENCY MEDICINE
Payer: COMMERCIAL

## 2022-07-15 VITALS
RESPIRATION RATE: 18 BRPM | HEIGHT: 60 IN | WEIGHT: 150 LBS | OXYGEN SATURATION: 99 % | BODY MASS INDEX: 29.45 KG/M2 | HEART RATE: 77 BPM | SYSTOLIC BLOOD PRESSURE: 119 MMHG | DIASTOLIC BLOOD PRESSURE: 78 MMHG | TEMPERATURE: 98.3 F

## 2022-07-15 DIAGNOSIS — S92.535A CLOSED NONDISPLACED FRACTURE OF DISTAL PHALANX OF LESSER TOE OF LEFT FOOT, INITIAL ENCOUNTER: ICD-10-CM

## 2022-07-15 PROCEDURE — 250N000013 HC RX MED GY IP 250 OP 250 PS 637: Performed by: EMERGENCY MEDICINE

## 2022-07-15 PROCEDURE — 73660 X-RAY EXAM OF TOE(S): CPT | Mod: LT

## 2022-07-15 PROCEDURE — 99285 EMERGENCY DEPT VISIT HI MDM: CPT | Mod: 25

## 2022-07-15 PROCEDURE — 28510 TREATMENT OF TOE FRACTURE: CPT | Mod: T3

## 2022-07-15 RX ORDER — IBUPROFEN 600 MG/1
600 TABLET, FILM COATED ORAL ONCE
Status: COMPLETED | OUTPATIENT
Start: 2022-07-15 | End: 2022-07-15

## 2022-07-15 RX ADMIN — IBUPROFEN 600 MG: 600 TABLET, FILM COATED ORAL at 13:43

## 2022-07-15 ASSESSMENT — ENCOUNTER SYMPTOMS
COLOR CHANGE: 1
ACTIVITY CHANGE: 0
ARTHRALGIAS: 1
MYALGIAS: 0
WOUND: 0

## 2022-07-15 NOTE — ED NOTES
ED Provider In Triage Note  [facility]  Encounter Date: Jul 15, 2022    Chief Complaint   Patient presents with     Foot Pain       Brief HPI:   Selena Lui is a 37 year old female presenting to the Emergency Department with a chief complaint of injury to left 4th toe sustained ~90 minutes ago when she opened the door of a commercial refrigerator and the roller on the door went over her toe. She was wearing shoes. Burning pain with weight-bearing, movement and palpation.     Brief Physical Exam:  /78   Temp 98.3  F (36.8  C)   Resp 18   Ht 1.524 m (5')   Wt 68 kg (150 lb)   SpO2 99%   BMI 29.29 kg/m    General: Non-toxic appearing  HEENT: Atraumatic  Resp: No respiratory distress  Abdomen: Non-peritoneal  Neuro: Alert, oriented, answers questions appropriately  Psych: Behavior appropriate    Left 4th toe with mild swelling. No open wounds. Reports numbness to toe - CMS otherwise grossly intact.     Plan Initiated in Triage:  Orders Placed This Encounter     XR Toe Left G/E 2 Views     ibuprofen (ADVIL/MOTRIN) tablet 600 mg         PIT Dispo:   Return to lobby while awaiting workup and ED bed availability    Lorna Nuñez MD on 7/15/2022 at 12:01 PM    Patient was evaluated by the Physician in Triage due to a limitation of available rooms in the Emergency Department. A plan of care was discussed based on the information obtained on the initial evaluation and patient was consuled to return back to the Emergency Department lobby after this initial evalutaiton until results were obtained or a room became available in the Emergency Department. Patient was counseled not to leave prior to receiving the results of their workup.     Lorna Nuñez MD  [facility] EMERGENCY DEPARTMENT  [address] [place] [id number]  [phone]       Lorna Nuñez MD  07/15/22 4541

## 2022-07-15 NOTE — ED TRIAGE NOTES
Patient states at 1030 today injury to left foot, large door with roller rolled over foot. Pain at 4th digit left foot     Triage Assessment     Row Name 07/15/22 1203       Triage Assessment (Adult)    Airway WDL WDL       Respiratory WDL    Respiratory WDL WDL       Skin Circulation/Temperature WDL    Skin Circulation/Temperature WDL WDL       Cardiac WDL    Cardiac WDL WDL       Peripheral/Neurovascular WDL    Peripheral Neurovascular WDL WDL       Cognitive/Neuro/Behavioral WDL    Cognitive/Neuro/Behavioral WDL WDL

## 2022-07-15 NOTE — ED PROVIDER NOTES
EMERGENCY DEPARTMENT ENCOUNTER      NAME: Selena Lui  AGE: 37 year old female  YOB: 1985  MRN: [id number]  EVALUATION DATE & TIME: No admission date for patient encounter.    PCP: No Ref-Primary, Physician    ED PROVIDER: Argelia Woodard PA-C      Chief Complaint   Patient presents with     Foot Pain         FINAL IMPRESSION:  1. Closed nondisplaced fracture of distal phalanx of lesser toe of left foot, initial encounter        MEDICAL DECISION MAKING:    Pertinent Labs & Imaging studies reviewed. (See chart for details)  37 year old female with a h/o diabetes, and anterior cruciate ligament repair (left) presents to the Emergency Department for evaluation of toe pain. Pt was wearing croc shoes at work while opened a commercial refrigerator door which rolled over the foot. Able to ambulate without difficulty. No skin breakdown. Pain at distal phalanx. Distal CMS intact. Vitals WNL.     Differential includes fracture, dislocation, strain, sprain. XR does show a non displaced fx of the conjoined middle and distal phalanx of the 4th toe on the left foot. Toe was migel taped and pt provided with hard soled shoe. Discussed pain management, elevation and ice. Pt declined note for work.     There is no evidence of acute or emergent process requiring intervention at this time. Pt is appropriate for outpatient management. Provisional nature of today's diagnosis was discussed and strict return precautions were given. Pt expressed understanding and She was discharged to home in good condition.     CRITICAL CARE: None    ED COURSE  12:44 PM  Met and evaluated patient. Discussed ED plan.   1:27 PM I rechecked and updated the patient.  1:30 PM discharged to home in good condition by RN.     MEDICATIONS GIVEN IN THE EMERGENCY:  Medications   ibuprofen (ADVIL/MOTRIN) tablet 600 mg (has no administration in time range)       NEW PRESCRIPTIONS STARTED AT TODAY'S ER VISIT  New Prescriptions    No medications on file           =================================================================    HPI    Patient information was obtained from: Patient     Use of Intrepreter: N/A      Selena Lui is a 37 year old female who presents a foot problem.    Patient reports she opened the door of a commercial fridge onto her left foot. She was wearing crocs and the door rolled onto her 4th toe. The toe is swollen, bruised, and in pain. The pain doesn't radiate into other parts of the foot. She can still move the toes. Patient denies any other complaints.       REVIEW OF SYSTEMS   Review of Systems   Constitutional: Negative for activity change.   Musculoskeletal: Positive for arthralgias. Negative for gait problem and myalgias.        Positive for left foot 4th toe pain.   Skin: Positive for color change. Negative for pallor, rash and wound.   Psychiatric/Behavioral: Negative for behavioral problems.   All other systems reviewed and are negative.        PAST MEDICAL HISTORY:  Past Medical History:   Diagnosis Date     ASCUS of cervix with negative high risk HPV 7/28/2015 11/8/11 NIL 7/28/15 ASCUS, neg HPV 9/1/15 NIL 3/8/21 NIL pap, neg HPV. Routine screening     Diabetes (H) 10/18/2016     Tears of meniscus and ACL of left knee 10/18/2016       PAST SURGICAL HISTORY:  Past Surgical History:   Procedure Laterality Date     ANTERIOR CRUCIATE LIGAMENT REPAIR Left     2010     IR BLOOD PATCH  9/16/2021     IR BLOOD PATCH  9/20/2021           CURRENT MEDICATIONS:    alcohol swab prep pads  blood glucose (NO BRAND SPECIFIED) test strip  blood glucose calibration (NO BRAND SPECIFIED) solution  blood glucose monitoring (NO BRAND SPECIFIED) meter device kit  thin (NO BRAND SPECIFIED) lancets        ALLERGIES:  No Known Allergies    FAMILY HISTORY:  Family History   Problem Relation Age of Onset     No Known Problems Mother      No Known Problems Father        SOCIAL HISTORY:   Social History     Socioeconomic History     Marital status: Single     Spouse  name: Not on file     Number of children: Not on file     Years of education: Not on file     Highest education level: Not on file   Occupational History     Not on file   Tobacco Use     Smoking status: Never Smoker     Smokeless tobacco: Never Used   Substance and Sexual Activity     Alcohol use: Never     Drug use: Never     Sexual activity: Yes   Other Topics Concern     Not on file   Social History Narrative     Not on file     Social Determinants of Health     Financial Resource Strain: Not on file   Food Insecurity: Not on file   Transportation Needs: Not on file   Physical Activity: Not on file   Stress: Not on file   Social Connections: Not on file   Intimate Partner Violence: Not on file   Housing Stability: Not on file         VITALS:  Patient Vitals for the past 24 hrs:   BP Temp Resp SpO2 Height Weight   07/15/22 1202 119/78 98.3  F (36.8  C) 18 99 % 1.524 m (5') 68 kg (150 lb)       PHYSICAL EXAM    Physical Exam  Vitals reviewed.   Constitutional:       Appearance: Normal appearance.   HENT:      Head: Normocephalic and atraumatic.   Cardiovascular:      Rate and Rhythm: Normal rate.   Pulmonary:      Effort: Pulmonary effort is normal.   Musculoskeletal:         General: Tenderness (distal left 4th toe) present. Normal range of motion.   Skin:     Capillary Refill: Capillary refill takes less than 2 seconds.      Findings: Bruising (over 4th left toe distal phalange) present.   Neurological:      General: No focal deficit present.      Mental Status: She is alert.          LAB:  All pertinent labs reviewed and interpreted.    Labs Ordered and Resulted from Time of ED Arrival to Time of ED Departure - No data to display      RADIOLOGY:  Reviewed all pertinent imaging. Please see official radiology report    XR Toe Left G/E 2 Views   Final Result   IMPRESSION: There is a nondisplaced fracture of the conjoined middle and distal phalanx of the 4th toe. Exam is otherwise negative.             Sarah BILLINGSLEY  Kell, am serving as a scribe to document services personally performed by Argelia Woodard PA-C based on my observation and the provider's statements to me. I, Argelia Woodard PA-C attest that Sarah Castorena is acting in a scribe capacity, has observed my performance of the services and has documented them in accordance with my direction.      Argelia Woodard PA-C  Emergency Medicine  [facility] EMERGENCY DEPARTMENT  [address] [place] [id number] [phone]  Dept: [phone]    This note has in part been created with speech recognition technology and may create an occasional, unintended word/grammar substitution. Errors are generally corrected in real time. Please message me via [employer] In Basket if you note any errors requiring clarification.     Argelia Woodard PA-C  07/15/22 8652

## 2022-07-15 NOTE — DISCHARGE INSTRUCTIONS
You were seen in the emergency department after an injury to your left foot at the toes.  You do have a small broken bone at the very tip of the toe.  We treat this by migel taping and wearing a hard soled shoe.  Your pain will improve over the next 1 to 2 weeks.  You may wear the shoe for 1 to 2 weeks time.  use Tylenol and ibuprofen for discomfort.  Applying ice can also be helpful.

## 2022-07-24 ENCOUNTER — HEALTH MAINTENANCE LETTER (OUTPATIENT)
Age: 37
End: 2022-07-24

## 2022-10-02 ENCOUNTER — HEALTH MAINTENANCE LETTER (OUTPATIENT)
Age: 37
End: 2022-10-02

## 2022-10-11 ENCOUNTER — OFFICE VISIT (OUTPATIENT)
Dept: FAMILY MEDICINE | Facility: CLINIC | Age: 37
End: 2022-10-11
Payer: COMMERCIAL

## 2022-10-11 VITALS
RESPIRATION RATE: 20 BRPM | TEMPERATURE: 98.3 F | DIASTOLIC BLOOD PRESSURE: 76 MMHG | WEIGHT: 150.8 LBS | OXYGEN SATURATION: 97 % | HEART RATE: 70 BPM | SYSTOLIC BLOOD PRESSURE: 111 MMHG | BODY MASS INDEX: 29.45 KG/M2

## 2022-10-11 DIAGNOSIS — S70.12XA CONTUSION OF LEFT THIGH, INITIAL ENCOUNTER: ICD-10-CM

## 2022-10-11 DIAGNOSIS — S29.012A UPPER BACK STRAIN, INITIAL ENCOUNTER: Primary | ICD-10-CM

## 2022-10-11 DIAGNOSIS — R11.0 NAUSEA: ICD-10-CM

## 2022-10-11 PROCEDURE — 99213 OFFICE O/P EST LOW 20 MIN: CPT | Performed by: FAMILY MEDICINE

## 2022-10-11 RX ORDER — ONDANSETRON 4 MG/1
4 TABLET, ORALLY DISINTEGRATING ORAL EVERY 6 HOURS PRN
Qty: 12 TABLET | Refills: 0 | Status: SHIPPED | OUTPATIENT
Start: 2022-10-11 | End: 2023-09-01
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
## 2022-10-11 NOTE — PATIENT INSTRUCTIONS
Use Ibuprofen 400-600 mg every 6 hours for pain.    Take prescribed medication as directed for nausea.    Stretch.    Chiro is OK.    Have a recheck in about 1-2 weeks if not better.

## 2022-11-07 ENCOUNTER — HOSPITAL ENCOUNTER (EMERGENCY)
Facility: HOSPITAL | Age: 37
Discharge: HOME | End: 2022-11-07
Attending: EMERGENCY MEDICINE | Admitting: EMERGENCY MEDICINE
Payer: COMMERCIAL

## 2022-11-07 VITALS
DIASTOLIC BLOOD PRESSURE: 68 MMHG | SYSTOLIC BLOOD PRESSURE: 106 MMHG | BODY MASS INDEX: 28.47 KG/M2 | TEMPERATURE: 96.9 F | OXYGEN SATURATION: 100 % | RESPIRATION RATE: 16 BRPM | HEIGHT: 60 IN | WEIGHT: 145 LBS | HEART RATE: 70 BPM

## 2022-11-07 DIAGNOSIS — Z23 NEED FOR DIPHTHERIA-TETANUS-PERTUSSIS (TDAP) VACCINE: ICD-10-CM

## 2022-11-07 DIAGNOSIS — S61.210A LACERATION OF RIGHT INDEX FINGER WITHOUT FOREIGN BODY WITHOUT DAMAGE TO NAIL, INITIAL ENCOUNTER: ICD-10-CM

## 2022-11-07 PROCEDURE — 250N000011 HC RX IP 250 OP 636: Performed by: EMERGENCY MEDICINE

## 2022-11-07 PROCEDURE — 99283 EMERGENCY DEPT VISIT LOW MDM: CPT | Mod: 25

## 2022-11-07 PROCEDURE — 90715 TDAP VACCINE 7 YRS/> IM: CPT | Performed by: EMERGENCY MEDICINE

## 2022-11-07 PROCEDURE — 90471 IMMUNIZATION ADMIN: CPT | Performed by: EMERGENCY MEDICINE

## 2022-11-07 PROCEDURE — 12001 RPR S/N/AX/GEN/TRNK 2.5CM/<: CPT

## 2022-11-07 RX ADMIN — CLOSTRIDIUM TETANI TOXOID ANTIGEN (FORMALDEHYDE INACTIVATED), CORYNEBACTERIUM DIPHTHERIAE TOXOID ANTIGEN (FORMALDEHYDE INACTIVATED), BORDETELLA PERTUSSIS TOXOID ANTIGEN (GLUTARALDEHYDE INACTIVATED), BORDETELLA PERTUSSIS FILAMENTOUS HEMAGGLUTININ ANTIGEN (FORMALDEHYDE INACTIVATED), BORDETELLA PERTUSSIS PERTACTIN ANTIGEN, AND BORDETELLA PERTUSSIS FIMBRIAE 2/3 ANTIGEN 0.5 ML: 5; 2; 2.5; 5; 3; 5 INJECTION, SUSPENSION INTRAMUSCULAR at 21:59

## 2022-11-08 NOTE — ED PROVIDER NOTES
EMERGENCY DEPARTMENT ENCOUNTER      NAME: Selena Lui  AGE: 37 year old female  YOB: 1985  MRN: [id number]  EVALUATION DATE & TIME: No admission date for patient encounter.    PCP: No Ref-Primary, Physician    ED PROVIDER: Anitra Howard MD    Chief Complaint   Patient presents with     Laceration         FINAL IMPRESSION:  1. Need for diphtheria-tetanus-pertussis (Tdap) vaccine    2. Laceration of right index finger without foreign body without damage to nail, initial encounter          ED COURSE & MEDICAL DECISION MAKING:    Pertinent Labs & Imaging studies reviewed. (See chart for details)  37 year old female with history of DM right hand dominant female who presents to the Emergency Department for evaluation of laceration to the right index finger while cleaning knives this evening.  Laceration does gape open with movement, but superficial and intrinsic hand movements are intact.  Laceration repaired, please see procedure note.  Tetanus updated.  Discharged home with instructions for suture removal.           9:35 PM Due to a shortage of available emergency department rooms, with the patient's permission I met with the patient for the initial interview and physical examination in a triage room. Discussed plan for treatment and workup in the ED. PPE: Provider wore gloves, and paper mask.    9:48 PM I discussed the plan for discharge with the patient, and patient is agreeable. We discussed supportive cares at home and reasons for return to the ER including new or worsening symptoms - all questions and concerns addressed. Patient to be discharged by RN.     Medical Decision Making    Supplemental history from: N/A    External Record(s) Reviewed: N/A    Differential Diagnosis: See MDM charting for differential considered.     I performed an independent interpretation of the: N/A    Discussed with radiology regarding test interpretation: N/A    Discussion of management with another provider: See ED  Course    The following testing was considered but ultimately not selected: None    I considered prescription management with: Antibiotic not indicated despite diabetes    The patient's care impacted: Diabetes    Consideration of Admission/Observation: N/A - Patient discharged without consideration for admission    Care significantly affected by Social Determinants of Health including: N/A    At the conclusion of the encounter I discussed the results of all of the tests and the disposition. The questions were answered. The patient or family acknowledged understanding and was agreeable with the care plan.      MEDICATIONS GIVEN IN THE EMERGENCY:  Medications   Tdap (tetanus-diphtheria-acell pertussis) (ADACEL) injection 0.5 mL (0.5 mLs Intramuscular Given 11/7/22 2159)       NEW PRESCRIPTIONS STARTED AT TODAY'S ER VISIT  Discharge Medication List as of 11/7/2022  9:55 PM             =================================================================    HPI    Patient information was obtained from: the patient     Use of Intrepreter: N/A        Selena Lui is a 37 year old female with pertinent medical history of type 2 diabetes mellitus who presents to the ED via walk in for evaluation of laceration.     The patient reports that she was washing dishes this evening when she the tip of her right index finger on a knife. She endorses right index finger pain and bleeding. She is not sure when her last tetanus shot was. The patient denies any other symptoms or complaints at this time.     Per Chart Review:   The patient's last tetanus shot was 4/5/2012    REVIEW OF SYSTEMS  Constitutional:  Denies fever, chills, weight loss or weakness  Musculoskeletal:  Denies any new muscle/joint pain, swelling or loss of function. Endorses right index finger pain  Skin:  Denies rash, pallor. Endorses right index finger laceration  Neurologic:  Denies headache, focal weakness or sensory changes      PAST MEDICAL HISTORY:  Past Medical  History:   Diagnosis Date     ASCUS of cervix with negative high risk HPV 7/28/2015 11/8/11 NIL 7/28/15 ASCUS, neg HPV 9/1/15 NIL 3/8/21 NIL pap, neg HPV. Routine screening     Diabetes (H) 10/18/2016     Tears of meniscus and ACL of left knee 10/18/2016       PAST SURGICAL HISTORY:  Past Surgical History:   Procedure Laterality Date     ANTERIOR CRUCIATE LIGAMENT REPAIR Left     2010     IR BLOOD PATCH  9/16/2021     IR BLOOD PATCH  9/20/2021       CURRENT MEDICATIONS:    Prior to Admission Medications   Prescriptions Last Dose Informant Patient Reported? Taking?   ondansetron (ZOFRAN ODT) 4 MG ODT tab   No No   Sig: Take 1 tablet (4 mg) by mouth every 6 hours as needed for nausea      Facility-Administered Medications: None       ALLERGIES:  No Known Allergies    FAMILY HISTORY:  Family History   Problem Relation Age of Onset     No Known Problems Mother      No Known Problems Father        SOCIAL HISTORY:  Social History     Tobacco Use     Smoking status: Never     Smokeless tobacco: Never   Substance Use Topics     Alcohol use: Never     Drug use: Never        VITALS:  Patient Vitals for the past 24 hrs:   BP Temp Temp src Pulse Resp SpO2 Height Weight   11/07/22 2049 106/68 96.9  F (36.1  C) Temporal 70 16 100 % 1.524 m (5') 65.8 kg (145 lb)       PHYSICAL EXAM    General Appearance: Well-appearing, well-nourished, no acute distress   Head:  Normocephalic  Eyes:   conjunctiva/corneas clear  ENT:  membranes are moist without pallor  Cardio:  Regular rate and rhythm  Pulm:  No respiratory distress  Extremities: Intrinsic hand movements intact  Skin:  Skin warm, dry, no rashes. 1.3 cm superficial V shaped laceration to the medial DIP of the right index finger that does gape open slightly with movement.   Neuro:  Alert and oriented ×3, moving all extremities, no gross sensory defects     PROCEDURES:    PROCEDURE: Laceration Repair   INDICATIONS: Laceration   PROCEDURE PROVIDER: Dr Anitra Howard   SITE: Tip of  right index finger   TYPE/SIZE: simple, clean and no foreign body visualized  1.3 cm (total length)   FUNCTIONAL ASSESSMENT: Distal sensation, circulation and motor intact   MEDICATION: 1 mLs of 1% Lidocaine with epinephrine   PREPARATION: scrubbing with Betadine   DEBRIDEMENT: no debridement   CLOSURE:  Superficial layer closed with 2 stitches of 5-0 Ethilon simple interrupted    Total number of sutures/staples placed: 2            The creation of this record is based on the scribe s observations of the work being performed by Anitra Howard MD and the provider s statements to them. It was created on her behalf by Alondra Chiu, a trained medical scribe. This document has been checked and approved by the attending provider.    Anitra Howard MD  Emergency Medicine  [facility] EMERGENCY DEPARTMENT  [facility] [facility] [address]  [phone]  Dept: [phone]      Anitra Howard MD  11/07/22 4540

## 2022-11-08 NOTE — ED TRIAGE NOTES
Pt here d/t cut on right index finger while washing knives. Laceration is on first knuckle dorsal and wraps partially around finger. Bleeding controlled.      Triage Assessment     Row Name 11/07/22 2050       Triage Assessment (Adult)    Airway WDL WDL

## 2022-12-25 ENCOUNTER — HEALTH MAINTENANCE LETTER (OUTPATIENT)
Age: 37
End: 2022-12-25

## 2023-04-16 ENCOUNTER — HEALTH MAINTENANCE LETTER (OUTPATIENT)
Age: 38
End: 2023-04-16

## 2023-09-01 ENCOUNTER — OFFICE VISIT (OUTPATIENT)
Dept: FAMILY MEDICINE | Facility: CLINIC | Age: 38
End: 2023-09-01
Payer: COMMERCIAL

## 2023-09-01 VITALS
HEART RATE: 84 BPM | DIASTOLIC BLOOD PRESSURE: 70 MMHG | BODY MASS INDEX: 30.04 KG/M2 | OXYGEN SATURATION: 99 % | WEIGHT: 153 LBS | SYSTOLIC BLOOD PRESSURE: 110 MMHG | HEIGHT: 60 IN | TEMPERATURE: 97.3 F | RESPIRATION RATE: 20 BRPM

## 2023-09-01 DIAGNOSIS — E11.9 TYPE 2 DIABETES MELLITUS WITHOUT COMPLICATION, WITHOUT LONG-TERM CURRENT USE OF INSULIN (H): ICD-10-CM

## 2023-09-01 DIAGNOSIS — Z00.00 ROUTINE ADULT HEALTH MAINTENANCE: Primary | ICD-10-CM

## 2023-09-01 PROBLEM — Z36.89 ENCOUNTER FOR TRIAGE IN PREGNANT PATIENT: Status: RESOLVED | Noted: 2021-09-09 | Resolved: 2023-09-01

## 2023-09-01 PROBLEM — Z34.90 ENCOUNTER FOR INDUCTION OF LABOR: Status: RESOLVED | Noted: 2021-09-15 | Resolved: 2023-09-01

## 2023-09-01 LAB
ANION GAP SERPL CALCULATED.3IONS-SCNC: 12 MMOL/L (ref 7–15)
BUN SERPL-MCNC: 8.8 MG/DL (ref 6–20)
CALCIUM SERPL-MCNC: 8.9 MG/DL (ref 8.6–10)
CHLORIDE SERPL-SCNC: 104 MMOL/L (ref 98–107)
CHOLEST SERPL-MCNC: 239 MG/DL
CREAT SERPL-MCNC: 0.49 MG/DL (ref 0.51–0.95)
CREAT UR-MCNC: 145 MG/DL
DEPRECATED HCO3 PLAS-SCNC: 19 MMOL/L (ref 22–29)
ERYTHROCYTE [DISTWIDTH] IN BLOOD BY AUTOMATED COUNT: 12.5 % (ref 10–15)
GFR SERPL CREATININE-BSD FRML MDRD: >90 ML/MIN/1.73M2
GLUCOSE SERPL-MCNC: 174 MG/DL (ref 70–99)
HBA1C MFR BLD: 9.4 % (ref 0–5.6)
HCT VFR BLD AUTO: 41.5 % (ref 35–47)
HDLC SERPL-MCNC: 48 MG/DL
HGB BLD-MCNC: 13.9 G/DL (ref 11.7–15.7)
LDLC SERPL CALC-MCNC: 171 MG/DL
MCH RBC QN AUTO: 28.3 PG (ref 26.5–33)
MCHC RBC AUTO-ENTMCNC: 33.5 G/DL (ref 31.5–36.5)
MCV RBC AUTO: 84 FL (ref 78–100)
MICROALBUMIN UR-MCNC: <12 MG/L
MICROALBUMIN/CREAT UR: NORMAL MG/G{CREAT}
NONHDLC SERPL-MCNC: 191 MG/DL
PLATELET # BLD AUTO: 253 10E3/UL (ref 150–450)
POTASSIUM SERPL-SCNC: 4 MMOL/L (ref 3.4–5.3)
RBC # BLD AUTO: 4.92 10E6/UL (ref 3.8–5.2)
SODIUM SERPL-SCNC: 135 MMOL/L (ref 136–145)
TRIGL SERPL-MCNC: 102 MG/DL
WBC # BLD AUTO: 7.7 10E3/UL (ref 4–11)

## 2023-09-01 PROCEDURE — 83036 HEMOGLOBIN GLYCOSYLATED A1C: CPT | Performed by: PHYSICIAN ASSISTANT

## 2023-09-01 PROCEDURE — 82043 UR ALBUMIN QUANTITATIVE: CPT | Performed by: PHYSICIAN ASSISTANT

## 2023-09-01 PROCEDURE — 80061 LIPID PANEL: CPT | Performed by: PHYSICIAN ASSISTANT

## 2023-09-01 PROCEDURE — 85027 COMPLETE CBC AUTOMATED: CPT | Performed by: PHYSICIAN ASSISTANT

## 2023-09-01 PROCEDURE — 36415 COLL VENOUS BLD VENIPUNCTURE: CPT | Performed by: PHYSICIAN ASSISTANT

## 2023-09-01 PROCEDURE — 80048 BASIC METABOLIC PNL TOTAL CA: CPT | Performed by: PHYSICIAN ASSISTANT

## 2023-09-01 PROCEDURE — 82570 ASSAY OF URINE CREATININE: CPT | Performed by: PHYSICIAN ASSISTANT

## 2023-09-01 PROCEDURE — 99395 PREV VISIT EST AGE 18-39: CPT | Performed by: PHYSICIAN ASSISTANT

## 2023-09-01 PROCEDURE — 99214 OFFICE O/P EST MOD 30 MIN: CPT | Mod: 25 | Performed by: PHYSICIAN ASSISTANT

## 2023-09-01 RX ORDER — METFORMIN HCL 500 MG
TABLET, EXTENDED RELEASE 24 HR ORAL
Qty: 360 TABLET | Refills: 1 | Status: SHIPPED | OUTPATIENT
Start: 2023-09-01 | End: 2023-09-05

## 2023-09-01 RX ORDER — BLOOD SUGAR DIAGNOSTIC
STRIP MISCELLANEOUS
Qty: 100 STRIP | Refills: 0 | Status: SHIPPED | OUTPATIENT
Start: 2023-09-01 | End: 2024-02-05

## 2023-09-01 ASSESSMENT — ENCOUNTER SYMPTOMS
DIARRHEA: 0
EYE PAIN: 0
WEAKNESS: 0
CHILLS: 0
SHORTNESS OF BREATH: 0
MYALGIAS: 0
DIZZINESS: 0
FREQUENCY: 0
BREAST MASS: 0
NAUSEA: 0
COUGH: 0
NERVOUS/ANXIOUS: 0
HEADACHES: 0
HEMATOCHEZIA: 0
HEARTBURN: 0
FEVER: 0
DYSURIA: 0
SORE THROAT: 0
ARTHRALGIAS: 0
ABDOMINAL PAIN: 0
HEMATURIA: 0
PALPITATIONS: 0
CONSTIPATION: 0
JOINT SWELLING: 0
PARESTHESIAS: 0

## 2023-09-01 ASSESSMENT — PAIN SCALES - GENERAL: PAINLEVEL: NO PAIN (0)

## 2023-09-01 NOTE — PROGRESS NOTES
"  {PROVIDER CHARTING PREFERENCE:368630}    Subjective   Selena is a 38 year old, presenting for the following health issues:  Physical    Healthy Habits:     Getting at least 3 servings of Calcium per day:  NO    Bi-annual eye exam:  NO    Dental care twice a year:  NO    Sleep apnea or symptoms of sleep apnea:  None    Diet:  Regular (no restrictions)    Frequency of exercise:  2-3 days/week    Duration of exercise:  Less than 15 minutes    Taking medications regularly:  No    Barriers to taking medications:  Problems remembering to take them    Medication side effects:  None    Additional concerns today:  Yes     Selena here today     Review of Systems   Constitutional:  Negative for chills and fever.   HENT:  Negative for congestion, ear pain, hearing loss and sore throat.    Eyes:  Negative for pain and visual disturbance.   Respiratory:  Negative for cough and shortness of breath.    Cardiovascular:  Negative for chest pain, palpitations and peripheral edema.   Gastrointestinal:  Negative for abdominal pain, constipation, diarrhea, heartburn, hematochezia and nausea.   Breasts:  Negative for tenderness, breast mass and discharge.   Genitourinary:  Negative for dysuria, frequency, genital sores, hematuria, pelvic pain, urgency and vaginal discharge.   Musculoskeletal:  Negative for arthralgias, joint swelling and myalgias.   Skin:  Negative for rash.   Neurological:  Negative for dizziness, weakness, headaches and paresthesias.   Psychiatric/Behavioral:  Negative for mood changes. The patient is not nervous/anxious.             Objective    /70 (BP Location: Right arm, Patient Position: Sitting, Cuff Size: Adult Small)   Pulse 84   Temp 97.3  F (36.3  C) (Temporal)   Resp 20   Ht 1.53 m (5' 0.24\")   Wt 69.4 kg (153 lb)   LMP 08/16/2023   SpO2 99%   BMI 29.65 kg/m    Body mass index is 29.65 kg/m .  Physical Exam   {Exam List (Optional):322642}  "

## 2023-09-01 NOTE — PROGRESS NOTES
SUBJECTIVE:   CC: Selena is an 38 year old who presents for preventive health visit.       2021     2:07 PM   Additional Questions   Roomed by Jacqui     The patient is a 38-year-old female who presents for her yearly physical and also due for diabetes follow-up.    She has been diagnosed with type 2 diabetes in the past. Her diabetes went down to normal towards the end of her last pregnancy (daughter almost 2 years old). She stopped taking the medication. It has been close to 2 years since she has had any medication. She notices it the most at night when she has frequent urination. She does not get jittery. 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 She was on metformin in the past, and she did have mild diarrhea.     HPI    Today's PHQ-2 Score:       2023    12:55 PM   PHQ-2 (  [employer])   Q1: Little interest or pleasure in doing things 0   Q2: Feeling down, depressed or hopeless 0   PHQ-2 Score 0   Q1: Little interest or pleasure in doing things Not at all   Q2: Feeling down, depressed or hopeless Not at all   PHQ-2 Score 0     Social History     Tobacco Use    Smoking status: Never    Smokeless tobacco: Never   Substance Use Topics    Alcohol use: Never             2023    12:55 PM   Alcohol Use   Prescreen: >3 drinks/day or >7 drinks/week? Not Applicable     Reviewed orders with patient.  Reviewed health maintenance and updated orders accordingly - Yes    Breast Cancer Screenin/1/2023    12:55 PM   Breast CA Risk Assessment (FHS-7)   Do you have a family history of breast, colon, or ovarian cancer? No / Unknown     Patient under 40 years of age: Routine Mammogram Screening not recommended.   Pertinent mammograms are reviewed under the imaging tab.    History of abnormal Pap smear: NO - age 30-65 PAP every 5 years with negative HPV co-testing recommended      Latest Ref Rng & Units 3/8/2021     2:14 PM   PAP / HPV   PAP Negative for squamous intraepithelial lesion or malignancy. Negative  "for squamous intraepithelial lesion or malignancy  Electronically signed by Bhavna Briones CT (ASCP) on 3/15/2021 at  3:27 PM      HPV 16 DNA NEG Negative    HPV 18 DNA NEG Negative    Other HR HPV NEG Negative      Reviewed and updated as needed this visit by clinical staff   Tobacco  Allergies  Meds  Problems  Med Hx  Surg Hx  Fam Hx          Reviewed and updated as needed this visit by Provider   Tobacco   Meds  Problems  Med Hx  Surg Hx  Fam Hx           Review of Systems     OBJECTIVE:   /70 (BP Location: Right arm, Patient Position: Sitting, Cuff Size: Adult Small)   Pulse 84   Temp 97.3  F (36.3  C) (Temporal)   Resp 20   Ht 1.53 m (5' 0.24\")   Wt 69.4 kg (153 lb)   LMP 08/16/2023   SpO2 99%   BMI 29.65 kg/m    Physical Exam  GENERAL: healthy, alert and no distress  EYES: Eyes grossly normal to inspection, PERRL and conjunctivae and sclerae normal  HENT: ear canals and TM's normal, nose and mouth without ulcers or lesions  NECK: no adenopathy, no asymmetry, masses, or scars and thyroid normal to palpation  RESP: lungs clear to auscultation - no rales, rhonchi or wheezes  BREAST: normal without masses, tenderness or nipple discharge and no palpable axillary masses or adenopathy  CV: regular rate and rhythm, normal S1 S2, no S3 or S4, no murmur, click or rub, no peripheral edema and peripheral pulses strong  ABDOMEN: soft, nontender, no hepatosplenomegaly, no masses and bowel sounds normal  MS: no gross musculoskeletal defects noted, no edema  SKIN: no suspicious lesions or rashes  NEURO: Normal strength and tone, mentation intact and speech normal  PSYCH: mentation appears normal, affect normal/bright      ASSESSMENT/PLAN:   Selena was seen today for physical.    Diagnoses and all orders for this visit:    Routine adult health maintenance  -     CBC with platelets; Future  -     CBC with platelets  -     REVIEW OF HEALTH MAINTENANCE PROTOCOL ORDERS    Type 2 diabetes mellitus " "without complication, without long-term current use of insulin (H) - Her A1C today (9.4%)  shows poor diabetic control. We will restart her on an oral medication - gradual increase with metformin up to 1g BID. We will send in a prescription for Accu-Chek strips - check blood sugars PRN. If she has any issues with diarrhea or other side effects before then, she will let us know and we can make adjustments if we need to. Follow up in 3 months, wants to establish care at [facility].   -     Hemoglobin A1c; Future  -     Lipid panel reflex to direct LDL Fasting; Future  -     Basic metabolic panel; Future  -     Albumin Random Urine Quantitative with Creat Ratio; Future  -     Hemoglobin A1c  -     Lipid panel reflex to direct LDL Fasting  -     Basic metabolic panel  -     Albumin Random Urine Quantitative with Creat Ratio  -     Adult Eye  Referral; Future  -     metFORMIN (GLUCOPHAGE XR) 500 MG 24 hr tablet; Week 1: take 1 tablet daily with a meal. Week 2: take 1 tablet twice a day with meals. Week 3: take 2 tablets in AM and 1 tablet in PM with meals. Weeks 4+: take 2 tablets twice a day with meals  -     ACCU-CHEK GUIDE test strip; Use to test blood sugar 1 daily.  -     PRIMARY CARE FOLLOW-UP SCHEDULING; Future    COUNSELING:  Reviewed preventive health counseling, as reflected in patient instructions      BMI:   Estimated body mass index is 29.65 kg/m  as calculated from the following:    Height as of this encounter: 1.53 m (5' 0.24\").    Weight as of this encounter: 69.4 kg (153 lb).     She reports that she has never smoked. She has never used smokeless tobacco.          Lyndsey Tapia PA-C  [facility]  "

## 2023-09-05 DIAGNOSIS — E11.9 TYPE 2 DIABETES MELLITUS WITHOUT COMPLICATION, WITHOUT LONG-TERM CURRENT USE OF INSULIN (H): ICD-10-CM

## 2023-09-05 RX ORDER — METFORMIN HCL 500 MG
TABLET, EXTENDED RELEASE 24 HR ORAL
Qty: 360 TABLET | Refills: 1 | Status: SHIPPED | OUTPATIENT
Start: 2023-09-05 | End: 2024-02-05

## 2023-09-05 NOTE — TELEPHONE ENCOUNTER
Medication Question or Refill    Contacts         Type Contact Phone/Fax    09/05/2023 10:56 AM CDT Phone (Incoming) Selena Lui (Self) [phone] (M)            What medication are you calling about (include dose and sig)?: Metformin 500 mg    Preferred Pharmacy:     [id number] - [place], [place] - [address]  [address] [place] [address]  Phone: [phone] Fax: [phone]      Controlled Substance Agreement on file:   CSA -- Patient Level:    CSA: None found at the patient level.       Who prescribed the medication?: Lyndsey Tapia    Do you need a refill? Need a new prescription sent to a different pharmacy    When did you use the medication last? N/a    Patient offered an appointment? No    Do you have any questions or concerns?  Yes: Patient went to pharmacy and they said they receive the test strips but not the metformin due to probable errors in system. Need a new script but [facility].      Could we send this information to you in [employer] or would you prefer to receive a phone call?:   No preference   Okay to leave a detailed message?: Yes at Cell number on file:    Telephone Information:   Mobile [phone]

## 2023-12-01 ENCOUNTER — OFFICE VISIT (OUTPATIENT)
Dept: FAMILY MEDICINE | Facility: CLINIC | Age: 38
End: 2023-12-01
Attending: PHYSICIAN ASSISTANT
Payer: COMMERCIAL

## 2023-12-01 ENCOUNTER — TELEPHONE (OUTPATIENT)
Dept: FAMILY MEDICINE | Facility: CLINIC | Age: 38
End: 2023-12-01

## 2023-12-01 VITALS
HEIGHT: 61 IN | OXYGEN SATURATION: 98 % | WEIGHT: 156 LBS | HEART RATE: 97 BPM | TEMPERATURE: 97.4 F | RESPIRATION RATE: 20 BRPM | BODY MASS INDEX: 29.45 KG/M2 | DIASTOLIC BLOOD PRESSURE: 75 MMHG | SYSTOLIC BLOOD PRESSURE: 116 MMHG

## 2023-12-01 DIAGNOSIS — E78.5 HYPERLIPIDEMIA LDL GOAL <70: ICD-10-CM

## 2023-12-01 DIAGNOSIS — E11.9 TYPE 2 DIABETES MELLITUS WITHOUT COMPLICATION, WITHOUT LONG-TERM CURRENT USE OF INSULIN (H): Primary | ICD-10-CM

## 2023-12-01 LAB — HBA1C MFR BLD: 7.7 % (ref 0–5.6)

## 2023-12-01 PROCEDURE — 82247 BILIRUBIN TOTAL: CPT | Performed by: INTERNAL MEDICINE

## 2023-12-01 PROCEDURE — 36415 COLL VENOUS BLD VENIPUNCTURE: CPT | Performed by: INTERNAL MEDICINE

## 2023-12-01 PROCEDURE — 99214 OFFICE O/P EST MOD 30 MIN: CPT | Mod: 25 | Performed by: INTERNAL MEDICINE

## 2023-12-01 PROCEDURE — 84155 ASSAY OF PROTEIN SERUM: CPT | Performed by: INTERNAL MEDICINE

## 2023-12-01 PROCEDURE — 90677 PCV20 VACCINE IM: CPT | Performed by: INTERNAL MEDICINE

## 2023-12-01 PROCEDURE — 80048 BASIC METABOLIC PNL TOTAL CA: CPT | Performed by: INTERNAL MEDICINE

## 2023-12-01 PROCEDURE — 84460 ALANINE AMINO (ALT) (SGPT): CPT | Performed by: INTERNAL MEDICINE

## 2023-12-01 PROCEDURE — 84075 ASSAY ALKALINE PHOSPHATASE: CPT | Performed by: INTERNAL MEDICINE

## 2023-12-01 PROCEDURE — 99207 PR FOOT EXAM NO CHARGE: CPT | Performed by: INTERNAL MEDICINE

## 2023-12-01 PROCEDURE — 90471 IMMUNIZATION ADMIN: CPT | Performed by: INTERNAL MEDICINE

## 2023-12-01 PROCEDURE — 82040 ASSAY OF SERUM ALBUMIN: CPT | Performed by: INTERNAL MEDICINE

## 2023-12-01 PROCEDURE — 83036 HEMOGLOBIN GLYCOSYLATED A1C: CPT | Performed by: INTERNAL MEDICINE

## 2023-12-01 RX ORDER — BLOOD-GLUCOSE METER
1 EACH MISCELLANEOUS PRN
Qty: 1 KIT | Refills: 0 | Status: SHIPPED | OUTPATIENT
Start: 2023-12-01 | End: 2024-02-05

## 2023-12-01 RX ORDER — BLOOD SUGAR DIAGNOSTIC
STRIP MISCELLANEOUS
Qty: 100 STRIP | Refills: 4 | Status: SHIPPED | OUTPATIENT
Start: 2023-12-01 | End: 2024-02-05

## 2023-12-01 RX ORDER — LANCETS
EACH MISCELLANEOUS
Qty: 100 EACH | Refills: 4 | Status: SHIPPED | OUTPATIENT
Start: 2023-12-01 | End: 2024-02-05

## 2023-12-01 NOTE — TELEPHONE ENCOUNTER
CENTRAL PRIOR AUTHORIZATION  [phone]      PRIOR AUTHORIZATION DENIED    Medication: OZEMPIC (0.25 OR 0.5 MG/DOSE) 2 MG/3ML SC SOPN  Insurance Company: [employer] - Phone [phone] Fax [phone]  Denial Date: 12/1/2023  Denial Rational:           Appeal Information:           Patient Notified: No. Please note: Providers/Clinics are to notify the patients of the denial outcomes and steps going forward.

## 2023-12-01 NOTE — PATIENT INSTRUCTIONS
- Start Ozempic- biggest side effects are stomach fullness, discomfort, and nausea- we will reduce the risk of these side effects by cutting portions in half at each meal (and then eat a little more if still hungry) and by slowly increasing the dose.  The other big side effect is constipation, keep an eye on this and try Miralax or senna if needed to have a soft, easy bowel movement each day.    - For the first month after starting Ozempic, continue the metformin as you are    - I will check in with you on MyChart in 1 month after starting the Ozempic, if things are going well, I can increase the dose to 0.5 mg weekly and then you can stop or decrease the metformin depending on what your morning blood sugars are    - Check glucose up to 4 times daily, but especially first thing in the morning and write down the numbers.    - I recommend going back to [facility] for repeat eye exam this year.    - Normally, I would recommend starting a statin medicine to lower LDL cholesterol under 70- let's recheck this level again in 3 months.      - I recommend working on dietary changes (reducing saturated fats, increasing vegetables and fruits, low carbohydrate- 50 grams of carbohydrates or less in a day) and increasing exercise (the recommendation 150 minutes per week of exercise which you can work towards gradually).

## 2023-12-01 NOTE — PROGRESS NOTES
Prior to immunization administration, verified patients identity using patient s name and date of birth. Please see Immunization Activity for additional information.     Screening Questionnaire for Adult Immunization    Are you sick today?   No   Do you have allergies to medications, food, a vaccine component or latex?   No   Have you ever had a serious reaction after receiving a vaccination?   No   Do you have a long-term health problem with heart, lung, kidney, or metabolic disease (e.g., diabetes), asthma, a blood disorder, no spleen, complement component deficiency, a cochlear implant, or a spinal fluid leak?  Are you on long-term aspirin therapy?   No   Do you have cancer, leukemia, HIV/AIDS, or any other immune system problem?   No   Do you have a parent, brother, or sister with an immune system problem?   No   In the past 3 months, have you taken medications that affect  your immune system, such as prednisone, other steroids, or anticancer drugs; drugs for the treatment of rheumatoid arthritis, Crohn s disease, or psoriasis; or have you had radiation treatments?   No   Have you had a seizure, or a brain or other nervous system problem?   No   During the past year, have you received a transfusion of blood or blood    products, or been given immune (gamma) globulin or antiviral drug?   No   For women: Are you pregnant or is there a chance you could become       pregnant during the next month?   No   Have you received any vaccinations in the past 4 weeks?   No     Immunization questionnaire answers were all negative.      Patient instructed to remain in clinic for 15 minutes afterwards, and to report any adverse reactions.     Screening performed by Nilay Blanc RN on 12/1/2023 at 2:13 PM.

## 2023-12-01 NOTE — PROGRESS NOTES
"  Assessment & Plan     (E11.9) Type 2 diabetes mellitus without complication, without long-term current use of insulin (H)  (primary encounter diagnosis)  Comment: Having significant diarrhea symptoms still with metformin despite titrating up slowly.  Would be interested in GLP-1 agonist because also has difficulty swallowing pills. Will check labs today.  Plan: Hemoglobin A1c, Blood Glucose Monitoring Suppl         (ACCU-CHEK GUIDE ME) w/Device KIT, ACCU-CHEK         GUIDE test strip, blood glucose monitoring         (SOFTCLIX) lancets, semaglutide (OZEMPIC) 2         MG/3ML pen, Comprehensive metabolic panel (BMP         + Alb, Alk Phos, ALT, AST, Total. Bili, TP)        - Will get eye exam at [facility] in [place]; due  - Foot exam done today and normal  - PCV20 today    (E78.5) Hyperlipidemia LDL goal <70  Comment: Would not like to start medication today- but will think about it.  Plan:   - Check lipids at next visit       BMI:   Estimated body mass index is 29.76 kg/m  as calculated from the following:    Height as of this encounter: 1.542 m (5' 0.71\").    Weight as of this encounter: 70.8 kg (156 lb).   Weight management plan: Discussed healthy diet and exercise guidelines    Patient Instructions   - Start Ozempic- biggest side effects are stomach fullness, discomfort, and nausea- we will reduce the risk of these side effects by cutting portions in half at each meal (and then eat a little more if still hungry) and by slowly increasing the dose.  The other big side effect is constipation, keep an eye on this and try Miralax or senna if needed to have a soft, easy bowel movement each day.    - For the first month after starting Ozempic, continue the metformin as you are    - I will check in with you on MyChart in 1 month after starting the Ozempic, if things are going well, I can increase the dose to 0.5 mg weekly and then you can stop or decrease the metformin depending on what your morning blood sugars " are    - Check glucose up to 4 times daily, but especially first thing in the morning and write down the numbers.    - I recommend going back to [facility] for repeat eye exam this year.    - Normally, I would recommend starting a statin medicine to lower LDL cholesterol under 70- let's recheck this level again in 3 months.      - I recommend working on dietary changes (reducing saturated fats, increasing vegetables and fruits, low carbohydrate- 50 grams of carbohydrates or less in a day) and increasing exercise (the recommendation 150 minutes per week of exercise which you can work towards gradually).    Jolene Jhaveri DO  [facility]    Kaia Walters is a 38 year old, presenting for the following health issues:  Follow Up (Diabetes)        12/1/2023     1:27 PM   Additional Questions   Roomed by gloria   Accompanied by self       History of Present Illness       Diabetes:   She presents for follow up of diabetes.    She is not checking blood glucose.         She has no concerns regarding her diabetes at this time.  She is having weight gain.  The patient has not had a diabetic eye exam in the last 12 months.         When takes metformin, has diarrhea.  Last visit, had talked about Ozempic as a possible option.       Diabetes Follow-up    How often are you checking your blood sugar? Not at all  What concerns do you have today about your diabetes? None   Do you have any of these symptoms? (Select all that apply)  Weight gain and No numbness or tingling in feet.  No redness, sores or blisters on feet.  No complaints of excessive thirst.  No reports of blurry vision.  No significant changes to weight.  Have you had a diabetic eye exam in the last 12 months? No        BP Readings from Last 2 Encounters:   12/01/23 116/75   09/01/23 110/70     Hemoglobin A1C (%)   Date Value   09/01/2023 9.4 (H)   11/29/2021 6.2 (H)     LDL Cholesterol Calculated (mg/dL)   Date Value   09/01/2023  "171 (H)         How many servings of fruits and vegetables do you eat daily?  0-1  On average, how many sweetened beverages do you drink each day (Examples: soda, juice, sweet tea, etc.  Do NOT count diet or artificially sweetened beverages)?   0  How many days per week do you exercise enough to make your heart beat faster? 3 or less  How many minutes a day do you exercise enough to make your heart beat faster? 9 or less  How many days per week do you miss taking your medication? 1  What makes it hard for you to take your medications?  side effects and remembering to take- diarrhea side effect    Review of Systems   Constitutional, HEENT, cardiovascular, pulmonary, gi and gu systems are negative, except as otherwise noted.      Objective    /75 (BP Location: Right arm, Patient Position: Sitting, Cuff Size: Adult Regular)   Pulse 97   Temp 97.4  F (36.3  C) (Temporal)   Resp 20   Ht 1.542 m (5' 0.71\")   Wt 70.8 kg (156 lb)   SpO2 98%   BMI 29.76 kg/m    Body mass index is 29.76 kg/m .  Physical Exam   GENERAL: healthy, alert and no distress  CV: regular rate and rhythm, normal S1 S2, no S3 or S4, no murmur, click or rub, no peripheral edema and peripheral pulses strong  MS: no gross musculoskeletal defects noted, no edema  Diabetic foot exam: normal DP and PT pulses, no trophic changes or ulcerative lesions, normal sensory exam, and normal monofilament exam                "

## 2023-12-02 ENCOUNTER — MYC MEDICAL ADVICE (OUTPATIENT)
Dept: FAMILY MEDICINE | Facility: CLINIC | Age: 38
End: 2023-12-02
Payer: COMMERCIAL

## 2023-12-02 DIAGNOSIS — E11.9 TYPE 2 DIABETES MELLITUS WITHOUT COMPLICATION, WITHOUT LONG-TERM CURRENT USE OF INSULIN (H): Primary | ICD-10-CM

## 2023-12-02 LAB
ALBUMIN SERPL BCG-MCNC: 4 G/DL (ref 3.5–5.2)
ALP SERPL-CCNC: 61 U/L (ref 40–150)
ALT SERPL W P-5'-P-CCNC: 41 U/L (ref 0–50)
ANION GAP SERPL CALCULATED.3IONS-SCNC: 10 MMOL/L (ref 7–15)
AST SERPL W P-5'-P-CCNC: ABNORMAL U/L
BILIRUB SERPL-MCNC: 0.3 MG/DL
BUN SERPL-MCNC: 10.8 MG/DL (ref 6–20)
CALCIUM SERPL-MCNC: 9.2 MG/DL (ref 8.6–10)
CHLORIDE SERPL-SCNC: 103 MMOL/L (ref 98–107)
CREAT SERPL-MCNC: 0.49 MG/DL (ref 0.51–0.95)
DEPRECATED HCO3 PLAS-SCNC: 23 MMOL/L (ref 22–29)
EGFRCR SERPLBLD CKD-EPI 2021: >90 ML/MIN/1.73M2
GLUCOSE SERPL-MCNC: 244 MG/DL (ref 70–99)
POTASSIUM SERPL-SCNC: 4.6 MMOL/L (ref 3.4–5.3)
PROT SERPL-MCNC: 7.3 G/DL (ref 6.4–8.3)
SODIUM SERPL-SCNC: 136 MMOL/L (ref 135–145)

## 2023-12-04 RX ORDER — LIRAGLUTIDE 6 MG/ML
0.6 INJECTION SUBCUTANEOUS DAILY
Qty: 60 ML | Refills: 3 | Status: SHIPPED | OUTPATIENT
Start: 2023-12-04 | End: 2024-01-02

## 2023-12-04 NOTE — TELEPHONE ENCOUNTER
Message sent via [employer].    Gema Valladares, RN, BSN, PHN  [facility]  [phone]

## 2023-12-05 NOTE — TELEPHONE ENCOUNTER
I sent Victoza to the pharmacy instead- can you please let her know this is daily injection instead and see if she is all right with that?  Would need to stay on the metformin for the first week, then when increase to 1.2 mg daily could stop the metformin.  GI side effects are most common, halve meal portions and watch for constipation.    Jolene Jhaveri, DO  Internal Medicine - Pediatrics Physician  [facility]

## 2023-12-05 NOTE — TELEPHONE ENCOUNTER
I called patient, she is okay with the daily injections    Informed of information below from provider. She will pick this up from pharmacy    JAROD Ziegler RN  [facility]

## 2023-12-06 NOTE — TELEPHONE ENCOUNTER
Dr. Jhaveri: Insulin pen needles pended for use with Victoza.    Radha MENDES RN  [facility]

## 2023-12-07 NOTE — TELEPHONE ENCOUNTER
Oh no- I didn't realize not included in package!  Thank you for pending- signed.    Jolene Jhaveri,   Internal Medicine - Pediatrics Physician  [facility]

## 2024-01-02 DIAGNOSIS — E11.9 TYPE 2 DIABETES MELLITUS WITHOUT COMPLICATION, WITHOUT LONG-TERM CURRENT USE OF INSULIN (H): ICD-10-CM

## 2024-01-02 RX ORDER — LIRAGLUTIDE 6 MG/ML
1.2 INJECTION SUBCUTANEOUS DAILY
Qty: 60 ML | Refills: 3 | Status: SHIPPED | OUTPATIENT
Start: 2024-01-02 | End: 2024-02-05

## 2024-02-05 ENCOUNTER — OFFICE VISIT (OUTPATIENT)
Dept: FAMILY MEDICINE | Facility: CLINIC | Age: 39
End: 2024-02-05
Payer: COMMERCIAL

## 2024-02-05 VITALS
DIASTOLIC BLOOD PRESSURE: 78 MMHG | WEIGHT: 149.5 LBS | HEIGHT: 61 IN | RESPIRATION RATE: 15 BRPM | BODY MASS INDEX: 28.22 KG/M2 | OXYGEN SATURATION: 100 % | TEMPERATURE: 97.3 F | HEART RATE: 85 BPM | SYSTOLIC BLOOD PRESSURE: 109 MMHG

## 2024-02-05 DIAGNOSIS — E11.9 TYPE 2 DIABETES MELLITUS WITHOUT COMPLICATION, WITHOUT LONG-TERM CURRENT USE OF INSULIN (H): Primary | ICD-10-CM

## 2024-02-05 DIAGNOSIS — E78.5 HYPERLIPIDEMIA LDL GOAL <70: ICD-10-CM

## 2024-02-05 LAB — HBA1C MFR BLD: 6.9 % (ref 0–5.6)

## 2024-02-05 PROCEDURE — 36415 COLL VENOUS BLD VENIPUNCTURE: CPT | Performed by: INTERNAL MEDICINE

## 2024-02-05 PROCEDURE — 83036 HEMOGLOBIN GLYCOSYLATED A1C: CPT | Performed by: INTERNAL MEDICINE

## 2024-02-05 PROCEDURE — 80053 COMPREHEN METABOLIC PANEL: CPT | Performed by: INTERNAL MEDICINE

## 2024-02-05 PROCEDURE — 80061 LIPID PANEL: CPT | Performed by: INTERNAL MEDICINE

## 2024-02-05 PROCEDURE — 99213 OFFICE O/P EST LOW 20 MIN: CPT | Performed by: INTERNAL MEDICINE

## 2024-02-05 RX ORDER — LIRAGLUTIDE 6 MG/ML
1.8 INJECTION SUBCUTANEOUS DAILY
Qty: 27 ML | Refills: 3 | Status: SHIPPED | OUTPATIENT
Start: 2024-02-05 | End: 2024-05-09

## 2024-02-05 ASSESSMENT — PAIN SCALES - GENERAL: PAINLEVEL: NO PAIN (0)

## 2024-02-05 NOTE — PROGRESS NOTES
Assessment & Plan     (E11.9) Type 2 diabetes mellitus without complication, without long-term current use of insulin (H)  (primary encounter diagnosis)  Comment: A1C 6.9 today.  Increase Victoza to 1.8 mg daily.  Stop metformin.  Recheck A1C in 3 months.  Has eye exam scheduled.  Plan: Hemoglobin A1c, liraglutide (VICTOZA) 18 MG/3ML        solution, insulin pen needle (32G X 4 MM) 32G X        4 MM miscellaneous, Lipid panel reflex to         direct LDL Fasting, Comprehensive metabolic         panel (BMP + Alb, Alk Phos, ALT, AST, Total.         Bili, TP)    (E78.5) Hyperlipidemia LDL goal <70  Comment:   - Repeat labs today, goal LDL <70        There are no Patient Instructions on file for this visit.    Kaia Walters is a 38 year old, presenting for the following health issues:  Recheck Medication        2/5/2024     1:54 PM   Additional Questions   Roomed by Gwen BUTLER     History of Present Illness       Diabetes:   She presents for follow up of diabetes.  She is checking home blood glucose a few times a month.   She checks blood glucose before meals and after meals.  Blood glucose is never over 200 and never under 70. She is aware of hypoglycemia symptoms including none.    She has no concerns regarding her diabetes at this time.   She is not experiencing numbness or burning in feet, excessive thirst, blurry vision, weight changes or redness, sores or blisters on feet. The patient has not had a diabetic eye exam in the last 12 months.          She eats 0-1 servings of fruits and vegetables daily.She consumes 0 sweetened beverage(s) daily.She exercises with enough effort to increase her heart rate 10 to 19 minutes per day.  She exercises with enough effort to increase her heart rate 3 or less days per week.      Has eye appointment scheduled.    No side effects on Victoza.  Still taking metformin.  Fasting today.    Thing are otherwise going well.  No new foot symptoms.      Objective    /78 (BP  "Location: Right arm, Patient Position: Sitting, Cuff Size: Adult Regular)   Pulse 85   Temp 97.3  F (36.3  C) (Temporal)   Resp 15   Ht 1.542 m (5' 0.71\")   Wt 67.8 kg (149 lb 8 oz)   LMP 02/01/2024 (Exact Date)   SpO2 100%   BMI 28.52 kg/m    Body mass index is 28.52 kg/m .  Physical Exam   GENERAL: alert and no distress  MS: no gross musculoskeletal defects noted  PSYCH: mentation appears normal, affect normal/bright            Signed Electronically by: Jolene Jhaveri DO    "

## 2024-02-06 ENCOUNTER — MYC MEDICAL ADVICE (OUTPATIENT)
Dept: FAMILY MEDICINE | Facility: CLINIC | Age: 39
End: 2024-02-06
Payer: COMMERCIAL

## 2024-02-06 ENCOUNTER — TRANSFERRED RECORDS (OUTPATIENT)
Dept: MULTI SPECIALTY CLINIC | Facility: CLINIC | Age: 39
End: 2024-02-06

## 2024-02-06 DIAGNOSIS — E11.9 TYPE 2 DIABETES MELLITUS WITHOUT COMPLICATION, WITHOUT LONG-TERM CURRENT USE OF INSULIN (H): ICD-10-CM

## 2024-02-06 DIAGNOSIS — E78.5 HYPERLIPIDEMIA LDL GOAL <70: Primary | ICD-10-CM

## 2024-02-06 LAB
ALBUMIN SERPL BCG-MCNC: 4.4 G/DL (ref 3.5–5.2)
ALP SERPL-CCNC: 70 U/L (ref 40–150)
ALT SERPL W P-5'-P-CCNC: 27 U/L (ref 0–50)
ANION GAP SERPL CALCULATED.3IONS-SCNC: 14 MMOL/L (ref 7–15)
AST SERPL W P-5'-P-CCNC: 21 U/L (ref 0–45)
BILIRUB SERPL-MCNC: 0.4 MG/DL
BUN SERPL-MCNC: 8.9 MG/DL (ref 6–20)
CALCIUM SERPL-MCNC: 9.3 MG/DL (ref 8.6–10)
CHLORIDE SERPL-SCNC: 99 MMOL/L (ref 98–107)
CHOLEST SERPL-MCNC: 263 MG/DL
CREAT SERPL-MCNC: 0.61 MG/DL (ref 0.51–0.95)
DEPRECATED HCO3 PLAS-SCNC: 23 MMOL/L (ref 22–29)
EGFRCR SERPLBLD CKD-EPI 2021: >90 ML/MIN/1.73M2
FASTING STATUS PATIENT QL REPORTED: YES
GLUCOSE SERPL-MCNC: 117 MG/DL (ref 70–99)
HDLC SERPL-MCNC: 38 MG/DL
LDLC SERPL CALC-MCNC: 190 MG/DL
NONHDLC SERPL-MCNC: 225 MG/DL
POTASSIUM SERPL-SCNC: 4.3 MMOL/L (ref 3.4–5.3)
PROT SERPL-MCNC: 7.9 G/DL (ref 6.4–8.3)
RETINOPATHY: NORMAL
SODIUM SERPL-SCNC: 136 MMOL/L (ref 135–145)
TRIGL SERPL-MCNC: 177 MG/DL

## 2024-02-08 RX ORDER — ROSUVASTATIN CALCIUM 20 MG/1
20 TABLET, COATED ORAL DAILY
Qty: 90 TABLET | Refills: 3 | Status: SHIPPED | OUTPATIENT
Start: 2024-02-08 | End: 2024-05-03

## 2024-04-14 ENCOUNTER — HEALTH MAINTENANCE LETTER (OUTPATIENT)
Age: 39
End: 2024-04-14

## 2024-04-17 ENCOUNTER — MYC MEDICAL ADVICE (OUTPATIENT)
Dept: FAMILY MEDICINE | Facility: CLINIC | Age: 39
End: 2024-04-17
Payer: COMMERCIAL

## 2024-04-18 NOTE — TELEPHONE ENCOUNTER
Yes please! Could you please use a same day slot in May?  I'm on jury duty the first two weeks and hoping I'll be dismissed so my availability might suddenly improve!    Thank you!     Jolene Jhaveri, DO  Internal Medicine - Pediatrics Physician  [facility]

## 2024-05-03 ENCOUNTER — OFFICE VISIT (OUTPATIENT)
Dept: FAMILY MEDICINE | Facility: CLINIC | Age: 39
End: 2024-05-03
Payer: COMMERCIAL

## 2024-05-03 VITALS
DIASTOLIC BLOOD PRESSURE: 74 MMHG | RESPIRATION RATE: 16 BRPM | BODY MASS INDEX: 30.43 KG/M2 | SYSTOLIC BLOOD PRESSURE: 109 MMHG | OXYGEN SATURATION: 100 % | TEMPERATURE: 97.2 F | WEIGHT: 155 LBS | HEART RATE: 88 BPM | HEIGHT: 60 IN

## 2024-05-03 DIAGNOSIS — Z12.31 VISIT FOR SCREENING MAMMOGRAM: ICD-10-CM

## 2024-05-03 DIAGNOSIS — E11.9 TYPE 2 DIABETES MELLITUS WITHOUT COMPLICATION, WITHOUT LONG-TERM CURRENT USE OF INSULIN (H): ICD-10-CM

## 2024-05-03 DIAGNOSIS — E78.5 HYPERLIPIDEMIA LDL GOAL <70: ICD-10-CM

## 2024-05-03 PROCEDURE — 99214 OFFICE O/P EST MOD 30 MIN: CPT | Performed by: INTERNAL MEDICINE

## 2024-05-03 RX ORDER — ROSUVASTATIN CALCIUM 20 MG/1
20 TABLET, COATED ORAL DAILY
Qty: 90 TABLET | Refills: 3 | Status: SHIPPED | OUTPATIENT
Start: 2024-05-03

## 2024-05-03 RX ORDER — TIRZEPATIDE 2.5 MG/.5ML
2.5 INJECTION, SOLUTION SUBCUTANEOUS
Qty: 1 ML | Refills: 0 | Status: SHIPPED | OUTPATIENT
Start: 2024-05-03 | End: 2024-06-11

## 2024-05-03 ASSESSMENT — PAIN SCALES - GENERAL: PAINLEVEL: NO PAIN (0)

## 2024-05-03 NOTE — PROGRESS NOTES
Assessment & Plan     (E11.9) Type 2 diabetes mellitus without complication, without long-term current use of insulin (H)  Comment: Has been on Victoza 1.8 mg daily monotherapy (likes this bc has difficulty swallowing pills)- see if can switch to Mounjaro.  Check A1C.  Plan: Hemoglobin A1c, rosuvastatin (CRESTOR) 20 MG         tablet, tirzepatide (MOUNJARO) 2.5 MG/0.5ML         pen, Lipid panel reflex to direct LDL Fasting  Get records from [facility]- had eye exam there in Feb 2024    (E78.5) Hyperlipidemia LDL goal <70  Comment: Has been off rosuvastatin x1 month because lost pills; restart and check lipids in 1 month.  Plan: rosuvastatin (CRESTOR) 20 MG tablet, Lipid         panel reflex to direct LDL Fasting    (Z12.31) Visit for screening mammogram  Comment:   Plan: MA Screen Bilateral w/[facility]win        There are no Patient Instructions on file for this visit.    Kaia Walters is a 39 year old, presenting for the following health issues:  Follow Up        5/3/2024     2:48 PM   Additional Questions   Roomed by Bashir SCHMIDT   Accompanied by self     Via the Health Maintenance questionnaire, the patient has reported the following services have been completed -Eye Exam, this information has been sent to the abstraction team.  History of Present Illness       Diabetes:   She presents for follow up of diabetes.    She is not checking blood glucose.         She has no concerns regarding her diabetes at this time.   She is not experiencing numbness or burning in feet, excessive thirst, blurry vision, weight changes or redness, sores or blisters on feet. The patient has had a diabetic eye exam in the last 12 months. Eye exam performed on 02/06/2024. Location of last eye exam [facility]  eye clinic.        She eats 0-1 servings of fruits and vegetables daily.She consumes 1 sweetened beverage(s) daily.She exercises with enough effort to increase her heart rate 20 to 29 minutes per day.  She exercises with enough effort to  "increase her heart rate 3 or less days per week. She is missing 2 dose(s) of medications per week.  She is not taking prescribed medications regularly due to remembering to take.     Lost rosuvastatin- so hasn't been taking it.      With the Victoza, has noticed that by noon the next day she is starving.  Wasn't low when this was happening.  12/4- Victoza 1.2 mg daily- increased to 1.8 mg daily.    Had eye appointment- had slightly difference in eyesight but no diabetic retinopathy.         Objective    /74 (BP Location: Right arm, Patient Position: Sitting, Cuff Size: Adult Regular)   Pulse 88   Temp 97.2  F (36.2  C) (Temporal)   Resp 16   Ht 1.52 m (4' 11.84\")   Wt 70.3 kg (155 lb)   LMP 04/10/2024 (Exact Date)   SpO2 100%   BMI 30.43 kg/m    Body mass index is 30.43 kg/m .  Physical Exam   GENERAL: alert and no distress  MS: no gross musculoskeletal defects noted, no edema  PSYCH: mentation appears normal, affect normal/bright            Signed Electronically by: Jolene Jhaveri, DO    "

## 2024-05-06 ENCOUNTER — TELEPHONE (OUTPATIENT)
Dept: FAMILY MEDICINE | Facility: CLINIC | Age: 39
End: 2024-05-06
Payer: COMMERCIAL

## 2024-05-06 DIAGNOSIS — E11.9 TYPE 2 DIABETES MELLITUS WITHOUT COMPLICATION, WITHOUT LONG-TERM CURRENT USE OF INSULIN (H): Primary | ICD-10-CM

## 2024-05-07 NOTE — TELEPHONE ENCOUNTER
PRIOR AUTHORIZATION DENIED    Medication: TIRZEPATIDE 2.5 MG/0.5ML SC SOPN  Insurance Company: [employer] - Phone [phone] Fax [phone]  Denial Date: 5/4/2024  Denial Reason(s):     Appeal Information:     Patient Notified: No

## 2024-05-08 NOTE — TELEPHONE ENCOUNTER
Got it- can you see if she would want me to try sending Ozempic to the pharmacy instead?  The Victoza is working all right, but the Ozempic would be weekly injections instead of daily.    Jolene Jhaveri, DO  Internal Medicine - Pediatrics Physician  [facility]

## 2024-05-08 NOTE — TELEPHONE ENCOUNTER
Dr. Jhaveri --  Yes, patient would like you to send over to the pharmacy the Ozempic.     Thank you,   ADAM ChapmanN RN  [facility]

## 2024-05-09 NOTE — TELEPHONE ENCOUNTER
Great- just sent it over!    Jolene Jhaveri, DO  Internal Medicine - Pediatrics Physician  [facility]

## 2024-05-09 NOTE — TELEPHONE ENCOUNTER
Left voicemail informing patient the discussed medication has been sent in to preferred pharmacy and to call back with any questions.  ADAM BeltranN, RN (she/her)  [facility] RN

## 2024-06-10 ENCOUNTER — LAB (OUTPATIENT)
Dept: LAB | Facility: CLINIC | Age: 39
End: 2024-06-10
Payer: COMMERCIAL

## 2024-06-10 DIAGNOSIS — E11.9 TYPE 2 DIABETES MELLITUS WITHOUT COMPLICATION, WITHOUT LONG-TERM CURRENT USE OF INSULIN (H): ICD-10-CM

## 2024-06-10 DIAGNOSIS — E78.5 HYPERLIPIDEMIA LDL GOAL <70: ICD-10-CM

## 2024-06-10 LAB — HBA1C MFR BLD: 7.1 % (ref 0–5.6)

## 2024-06-10 PROCEDURE — 83036 HEMOGLOBIN GLYCOSYLATED A1C: CPT

## 2024-06-10 PROCEDURE — 80061 LIPID PANEL: CPT

## 2024-06-10 PROCEDURE — 36415 COLL VENOUS BLD VENIPUNCTURE: CPT

## 2024-06-11 LAB
CHOLEST SERPL-MCNC: 120 MG/DL
FASTING STATUS PATIENT QL REPORTED: ABNORMAL
HDLC SERPL-MCNC: 42 MG/DL
LDLC SERPL CALC-MCNC: 70 MG/DL
NONHDLC SERPL-MCNC: 78 MG/DL
TRIGL SERPL-MCNC: 42 MG/DL

## 2024-06-24 ENCOUNTER — OFFICE VISIT (OUTPATIENT)
Dept: URGENT CARE | Facility: URGENT CARE | Age: 39
End: 2024-06-24
Payer: COMMERCIAL

## 2024-06-24 VITALS
SYSTOLIC BLOOD PRESSURE: 115 MMHG | RESPIRATION RATE: 16 BRPM | TEMPERATURE: 97.3 F | HEART RATE: 88 BPM | DIASTOLIC BLOOD PRESSURE: 76 MMHG | OXYGEN SATURATION: 100 % | BODY MASS INDEX: 29.45 KG/M2 | HEIGHT: 60 IN | WEIGHT: 150 LBS

## 2024-06-24 DIAGNOSIS — N30.00 ACUTE CYSTITIS WITHOUT HEMATURIA: Primary | ICD-10-CM

## 2024-06-24 LAB
ALBUMIN UR-MCNC: ABNORMAL MG/DL
APPEARANCE UR: ABNORMAL
BACTERIA #/AREA URNS HPF: ABNORMAL /HPF
BILIRUB UR QL STRIP: NEGATIVE
COLOR UR AUTO: YELLOW
GLUCOSE UR STRIP-MCNC: NEGATIVE MG/DL
HGB UR QL STRIP: ABNORMAL
KETONES UR STRIP-MCNC: NEGATIVE MG/DL
LEUKOCYTE ESTERASE UR QL STRIP: ABNORMAL
NITRATE UR QL: POSITIVE
PH UR STRIP: 8 [PH] (ref 5–7)
RBC #/AREA URNS AUTO: ABNORMAL /HPF
SP GR UR STRIP: 1.02 (ref 1–1.03)
SQUAMOUS #/AREA URNS AUTO: ABNORMAL /LPF
UROBILINOGEN UR STRIP-ACNC: 0.2 E.U./DL
WBC #/AREA URNS AUTO: ABNORMAL /HPF
WBC CLUMPS #/AREA URNS HPF: PRESENT /HPF

## 2024-06-24 PROCEDURE — 87086 URINE CULTURE/COLONY COUNT: CPT | Performed by: PHYSICIAN ASSISTANT

## 2024-06-24 PROCEDURE — 81001 URINALYSIS AUTO W/SCOPE: CPT | Performed by: PHYSICIAN ASSISTANT

## 2024-06-24 PROCEDURE — 87088 URINE BACTERIA CULTURE: CPT | Performed by: PHYSICIAN ASSISTANT

## 2024-06-24 PROCEDURE — 87186 SC STD MICRODIL/AGAR DIL: CPT | Performed by: PHYSICIAN ASSISTANT

## 2024-06-24 PROCEDURE — 99213 OFFICE O/P EST LOW 20 MIN: CPT | Performed by: PHYSICIAN ASSISTANT

## 2024-06-24 RX ORDER — NITROFURANTOIN 25; 75 MG/1; MG/1
100 CAPSULE ORAL 2 TIMES DAILY
Qty: 14 CAPSULE | Refills: 0 | Status: SHIPPED | OUTPATIENT
Start: 2024-06-24 | End: 2024-07-01

## 2024-06-24 ASSESSMENT — ENCOUNTER SYMPTOMS
FLANK PAIN: 1
DYSURIA: 1
NAUSEA: 1
FREQUENCY: 1

## 2024-06-24 NOTE — PROGRESS NOTES
Assessment & Plan     Acute cystitis without hematuria  - UA with Microscopic reflex to Culture - Clinic Collect  - Urine Microscopic Exam  - Urine Culture  - nitroFURantoin macrocrystal-monohydrate (MACROBID) 100 MG capsule  Dispense: 14 capsule; Refill: 0     Return in about 7 years (around 6/24/2031), or PCP As needed, for Follow up.    Take your antibiotics as directed. Do not stop taking them just because you feel better. You need to take the full course of antibiotics.  Drink extra water and other fluids for the next day or two. This will help make the urine less concentrated and help wash out the bacteria that are causing the infection.   Avoid drinks that are carbonated or have caffeine. They can irritate the bladder.  Urinate often. Try to empty your bladder each time.  To relieve pain, take a hot bath or lay a heating pad set on low over your lower belly or genital area. Never go to sleep with a heating pad in place.  Take over the counter tylenol and ibuprofen for pain      To prevent UTIs  Drink plenty of water each day. This helps you urinate often, which clears bacteria from your system.   Urinate when you need to.  If you are sexually active, urinate right after you have sex.  Change sanitary pads often.  Avoid douches, bubble baths, feminine hygiene sprays, and other feminine hygiene products that have deodorants.  After going to the bathroom, wipe from front to back.    Fouzia Whitman, Student NP   [facility]    Kaia Walters is a 39 year old female who presents to clinic today for the following health issues:  Chief Complaint   Patient presents with    Back Pain     Lower right side back pain     Urinary Problem     Had burning sensation while urinating, frequent need to urinate     Urgent Care       HPI    39 year old female with history of UTI, diabetes and ASCUS of the cervix who presents to urgent care with dysuria x4 days. Per pt burning sensation upon  urination started on Friday. On Saturday pt developed nausea with no emesis, and on Sunday developed right flank pain. Flank pain is a sharp intermittent pain that is 5/10 on a ten-point pain scale. Tried OTC Azo around this time with no improvement in symptoms.     Denies fever, pelvic pain, abdominal pain, fatigue, and rigors    UTI  Onset of symptoms was 4day(s).  Course of illness is worsening  Severity moderate  Current and associated symptoms dysuria, frequency, back pain, nausea, vomiting, mild chills, and voiding in small amounts  Treatment and measures tried Azo  Predisposing factors include history of frequent UTI's and diabetes  Patient denies temperature > 101 degrees F., vomiting, taking Coumadin, GFR less than 30 within the last year, vaginal discharge, vaginal itching, and dyspareunia      Review of Systems   Gastrointestinal:  Positive for nausea.   Genitourinary:  Positive for dysuria, flank pain, frequency and urgency.     Constitutional, HEENT, cardiovascular, pulmonary, and gi systems are negative, except as otherwise noted.      Objective    /76   Pulse 88   Temp 97.3  F (36.3  C) (Temporal)   Resp 16   Ht 1.524 m (5')   Wt 68 kg (150 lb)   LMP  (LMP Unknown)   SpO2 100%   BMI 29.29 kg/m      Physical Exam  Constitutional:       General: She is awake.      Appearance: Normal appearance. She is well-groomed.   HENT:      Head: Normocephalic and atraumatic.      Jaw: There is normal jaw occlusion.      Nose: Nose normal.      Mouth/Throat:      Mouth: Mucous membranes are moist.      Pharynx: Oropharynx is clear.   Eyes:      Extraocular Movements: Extraocular movements intact.      Conjunctiva/sclera: Conjunctivae normal.      Pupils: Pupils are equal, round, and reactive to light.   Cardiovascular:      Rate and Rhythm: Normal rate and regular rhythm.      Heart sounds: Normal heart sounds, S1 normal and S2 normal.   Abdominal:      General: Abdomen is flat. Bowel sounds are  normal.      Palpations: Abdomen is soft.   Musculoskeletal:        Arms:       Cervical back: Normal range of motion and neck supple.      Comments: Right CVA tenderness      Skin:     General: Skin is warm and dry.   Neurological:      Mental Status: She is alert.   Psychiatric:         Behavior: Behavior is cooperative.        Results for orders placed or performed in visit on 06/24/24 (from the past 24 hour(s))   UA with Microscopic reflex to Culture - Clinic Collect    Specimen: Urine, Midstream   Result Value Ref Range    Color Urine Yellow Colorless, Straw, Light Yellow, Yellow    Appearance Urine Slightly Cloudy (A) Clear    Glucose Urine Negative Negative mg/dL    Bilirubin Urine Negative Negative    Ketones Urine Negative Negative mg/dL    Specific Gravity Urine 1.020 1.003 - 1.035    Blood Urine Trace (A) Negative    pH Urine 8.0 (H) 5.0 - 7.0    Protein Albumin Urine Trace (A) Negative mg/dL    Urobilinogen Urine 0.2 0.2, 1.0 E.U./dL    Nitrite Urine Positive (A) Negative    Leukocyte Esterase Urine Small (A) Negative   Urine Microscopic Exam   Result Value Ref Range    Bacteria Urine Moderate (A) None Seen /HPF    RBC Urine 0-2 0-2 /HPF /HPF    WBC Urine 10-25 (A) 0-5 /HPF /HPF    Squamous Epithelials Urine Few (A) None Seen /LPF    WBC Clumps Urine Present (A) None Seen /HPF

## 2024-06-24 NOTE — PROGRESS NOTES
I have reviewed and confirmed the review of systems, HPI, and past/family and medical history as documented by the NP/PA student. I have seen and evaluated the patient. I agree with the resident s note as documented. Any changes/amendments I have made will be listed below.     -Ankur Hernandez PA-C

## 2024-06-25 LAB — BACTERIA UR CULT: ABNORMAL

## 2024-08-02 ENCOUNTER — PATIENT OUTREACH (OUTPATIENT)
Dept: CARE COORDINATION | Facility: CLINIC | Age: 39
End: 2024-08-02
Payer: COMMERCIAL

## 2024-08-16 ENCOUNTER — PATIENT OUTREACH (OUTPATIENT)
Dept: CARE COORDINATION | Facility: CLINIC | Age: 39
End: 2024-08-16
Payer: COMMERCIAL

## 2024-11-10 ENCOUNTER — HEALTH MAINTENANCE LETTER (OUTPATIENT)
Age: 39
End: 2024-11-10

## 2025-02-26 ENCOUNTER — MYC MEDICAL ADVICE (OUTPATIENT)
Dept: FAMILY MEDICINE | Facility: CLINIC | Age: 40
End: 2025-02-26
Payer: COMMERCIAL

## 2025-02-26 DIAGNOSIS — E11.9 TYPE 2 DIABETES MELLITUS WITHOUT COMPLICATION (H): ICD-10-CM

## 2025-02-26 DIAGNOSIS — E11.9 TYPE 2 DIABETES MELLITUS WITHOUT COMPLICATION, WITHOUT LONG-TERM CURRENT USE OF INSULIN (H): ICD-10-CM

## 2025-02-26 DIAGNOSIS — E78.5 HYPERLIPIDEMIA LDL GOAL <70: Primary | ICD-10-CM

## 2025-02-26 DIAGNOSIS — Z12.31 ENCOUNTER FOR SCREENING MAMMOGRAM FOR BREAST CANCER: ICD-10-CM

## 2025-02-26 NOTE — TELEPHONE ENCOUNTER
Dr. Jhaveri - labs pended below, appreciate any additional orders as appropriate.    Writer replied to patient via [employer].    MISAEL Hagan, BSN, PHN, AMB-BC (she/her)  [facility] RN

## 2025-02-27 ENCOUNTER — PATIENT OUTREACH (OUTPATIENT)
Dept: CARE COORDINATION | Facility: CLINIC | Age: 40
End: 2025-02-27
Payer: COMMERCIAL

## 2025-02-27 NOTE — TELEPHONE ENCOUNTER
Upower message sent to patient.  MISAEL Pierson, BSN, PHN, RN-[facility]  [phone]

## 2025-02-27 NOTE — TELEPHONE ENCOUNTER
Yes!  I ordered labs as well as the screening mammogram to get some things rolling while waiting to get in- I recommend getting on the waitlist, too because that really does work.

## 2025-03-02 ENCOUNTER — HEALTH MAINTENANCE LETTER (OUTPATIENT)
Age: 40
End: 2025-03-02

## 2025-04-28 ENCOUNTER — OFFICE VISIT (OUTPATIENT)
Dept: URGENT CARE | Facility: URGENT CARE | Age: 40
End: 2025-04-28
Payer: COMMERCIAL

## 2025-04-28 VITALS
WEIGHT: 150 LBS | HEART RATE: 82 BPM | TEMPERATURE: 97.6 F | OXYGEN SATURATION: 100 % | BODY MASS INDEX: 29.29 KG/M2 | DIASTOLIC BLOOD PRESSURE: 77 MMHG | SYSTOLIC BLOOD PRESSURE: 113 MMHG | RESPIRATION RATE: 16 BRPM

## 2025-04-28 DIAGNOSIS — S05.02XA ABRASION OF LEFT CORNEA, INITIAL ENCOUNTER: Primary | ICD-10-CM

## 2025-04-28 PROCEDURE — 3074F SYST BP LT 130 MM HG: CPT | Performed by: PHYSICIAN ASSISTANT

## 2025-04-28 PROCEDURE — 3078F DIAST BP <80 MM HG: CPT | Performed by: PHYSICIAN ASSISTANT

## 2025-04-28 PROCEDURE — 99214 OFFICE O/P EST MOD 30 MIN: CPT | Performed by: PHYSICIAN ASSISTANT

## 2025-04-28 RX ORDER — ERYTHROMYCIN 5 MG/G
0.5 OINTMENT OPHTHALMIC 3 TIMES DAILY
Qty: 3.5 G | Refills: 0 | Status: SHIPPED | OUTPATIENT
Start: 2025-04-28 | End: 2025-05-05

## 2025-04-28 NOTE — PROGRESS NOTES
SUBJECTIVE:  Chief Complaint:   Chief Complaint   Patient presents with    Eye Problem     Carmen sensation in Lt eye Saturday. Sensitive to light. Blurry. Burning. Watery.     History of Present Illness:  Selena Lui is a 40 year old female who presents complaining of moderate left eye mattering, redness, eyelid swelling, itching for 2 day(s).   Onset/timing: sudden, worsening.    Associated Signs and Symptoms: none  Treatment measures tried include: flushed with water   Contact wearer : No    Past Medical History:   Diagnosis Date    ASCUS of cervix with negative high risk HPV 7/28/2015 11/8/11 NIL 7/28/15 ASCUS, neg HPV 9/1/15 NIL 3/8/21 NIL pap, neg HPV. Routine screening    Diabetes (H) 10/18/2016    Tears of meniscus and ACL of left knee 10/18/2016     Current Outpatient Medications   Medication Sig Dispense Refill    insulin pen needle (32G X 4 MM) 32G X 4 MM miscellaneous Use one pen needle daily with Victoza or as directed. 100 each 3    rosuvastatin (CRESTOR) 20 MG tablet Take 1 tablet (20 mg) by mouth daily 90 tablet 3    Semaglutide, 1 MG/DOSE, (OZEMPIC) 4 MG/3ML pen Inject 1 mg Subcutaneous every 7 days Transition from liraglutide 1.8 mg daily. 9 mL 1    Semaglutide, 2 MG/DOSE, (OZEMPIC) 8 MG/3ML pen Inject 2 mg Subcutaneous every 7 days 9 mL 3        ROS:  Review of systems negative except as stated above.    OBJECTIVE:  /77   Pulse 82   Temp 97.6  F (36.4  C) (Tympanic)   Resp 16   Wt 68 kg (150 lb)   LMP 04/23/2025 (Approximate)   SpO2 100%   BMI 29.29 kg/m    General: no acute distress  Eye exam: right eye normal lid, conjunctiva, cornea, pupil and fundus, left eye abnormal findings: pain resolves with topical, flouresceine uptake at 2 oclock.      ASSESSMENT:  (S05.02XA) Abrasion of left cornea, initial encounter  (primary encounter diagnosis)  Plan: erythromycin (ROMYCIN) 5 MG/GM ophthalmic         ointment      Follow up with PCP if symptoms worsen or fail to improve  Red flags and  emergent follow up discussed, and understood by patient  Follow up with PCP if symptoms worsen or fail to improve

## 2025-04-28 NOTE — PROGRESS NOTES
Urgent Care Clinic Visit    Chief Complaint   Patient presents with    Eye Problem     Carmen sensation in Lt eye Saturday. Sensitive to light. Blurry. Burning. Watery.                   4/28/2025    10:41 AM   Additional Questions   Roomed by Miles Gan MA   Accompanied by Self       Miles Gan MA on 4/28/2025 at 10:44 AM

## 2025-05-11 DIAGNOSIS — E11.9 TYPE 2 DIABETES MELLITUS WITHOUT COMPLICATION, WITHOUT LONG-TERM CURRENT USE OF INSULIN (H): ICD-10-CM

## 2025-05-11 DIAGNOSIS — E78.5 HYPERLIPIDEMIA LDL GOAL <70: ICD-10-CM

## 2025-05-11 RX ORDER — ROSUVASTATIN CALCIUM 20 MG/1
20 TABLET, COATED ORAL DAILY
Qty: 60 TABLET | Refills: 0 | Status: SHIPPED | OUTPATIENT
Start: 2025-05-11

## 2025-05-12 ENCOUNTER — LAB (OUTPATIENT)
Dept: LAB | Facility: CLINIC | Age: 40
End: 2025-05-12
Payer: COMMERCIAL

## 2025-05-12 DIAGNOSIS — E78.5 HYPERLIPIDEMIA LDL GOAL <70: ICD-10-CM

## 2025-05-12 DIAGNOSIS — E11.9 TYPE 2 DIABETES MELLITUS WITHOUT COMPLICATION, WITHOUT LONG-TERM CURRENT USE OF INSULIN (H): ICD-10-CM

## 2025-05-12 LAB
ALBUMIN SERPL BCG-MCNC: 4.6 G/DL (ref 3.5–5.2)
ALP SERPL-CCNC: 68 U/L (ref 40–150)
ALT SERPL W P-5'-P-CCNC: 33 U/L (ref 0–50)
ANION GAP SERPL CALCULATED.3IONS-SCNC: 15 MMOL/L (ref 7–15)
AST SERPL W P-5'-P-CCNC: 34 U/L (ref 0–45)
BILIRUB SERPL-MCNC: 0.6 MG/DL
BUN SERPL-MCNC: 14.8 MG/DL (ref 6–20)
CALCIUM SERPL-MCNC: 9 MG/DL (ref 8.8–10.4)
CHLORIDE SERPL-SCNC: 98 MMOL/L (ref 98–107)
CHOLEST SERPL-MCNC: 178 MG/DL
CREAT SERPL-MCNC: 0.6 MG/DL (ref 0.51–0.95)
EGFRCR SERPLBLD CKD-EPI 2021: >90 ML/MIN/1.73M2
EST. AVERAGE GLUCOSE BLD GHB EST-MCNC: 183 MG/DL
FASTING STATUS PATIENT QL REPORTED: ABNORMAL
FASTING STATUS PATIENT QL REPORTED: ABNORMAL
GLUCOSE SERPL-MCNC: 187 MG/DL (ref 70–99)
HBA1C MFR BLD: 8 % (ref 0–5.6)
HCO3 SERPL-SCNC: 21 MMOL/L (ref 22–29)
HDLC SERPL-MCNC: 56 MG/DL
LDLC SERPL CALC-MCNC: 107 MG/DL
NONHDLC SERPL-MCNC: 122 MG/DL
POTASSIUM SERPL-SCNC: 3.6 MMOL/L (ref 3.4–5.3)
PROT SERPL-MCNC: 8.4 G/DL (ref 6.4–8.3)
SODIUM SERPL-SCNC: 134 MMOL/L (ref 135–145)
TRIGL SERPL-MCNC: 73 MG/DL

## 2025-05-12 PROCEDURE — 80053 COMPREHEN METABOLIC PANEL: CPT

## 2025-05-12 PROCEDURE — 36415 COLL VENOUS BLD VENIPUNCTURE: CPT

## 2025-05-12 PROCEDURE — 80061 LIPID PANEL: CPT

## 2025-05-12 PROCEDURE — 83036 HEMOGLOBIN GLYCOSYLATED A1C: CPT

## 2025-05-12 PROCEDURE — 82043 UR ALBUMIN QUANTITATIVE: CPT

## 2025-05-12 PROCEDURE — 82570 ASSAY OF URINE CREATININE: CPT

## 2025-05-13 LAB
CREAT UR-MCNC: 398.1 MG/DL
MICROALBUMIN UR-MCNC: 57.9 MG/L
MICROALBUMIN/CREAT UR: 14.54 MG/G CR (ref 0–25)

## 2025-05-14 ENCOUNTER — RESULTS FOLLOW-UP (OUTPATIENT)
Dept: FAMILY MEDICINE | Facility: CLINIC | Age: 40
End: 2025-05-14

## 2025-05-15 NOTE — RESULT ENCOUNTER NOTE
Dear Selena,    Your A1C and cholesterol look like they're trending upwards.  I recommend coming back in to see me to talk about if we need to adjust your medications.  Please schedule a follow up visit for us to talk about it more or let me know if you have questions in the meantime.    Kind regards,    Jolene Jhaveri, DO  Internal Medicine - Pediatrics Physician  [facility]

## 2025-06-02 DIAGNOSIS — E11.9 TYPE 2 DIABETES MELLITUS WITHOUT COMPLICATION, WITHOUT LONG-TERM CURRENT USE OF INSULIN (H): ICD-10-CM

## 2025-06-03 RX ORDER — SEMAGLUTIDE 2.68 MG/ML
INJECTION, SOLUTION SUBCUTANEOUS
Qty: 9 ML | Refills: 3 | Status: SHIPPED | OUTPATIENT
Start: 2025-06-03

## 2025-07-20 DIAGNOSIS — E78.5 HYPERLIPIDEMIA LDL GOAL <70: ICD-10-CM

## 2025-07-20 DIAGNOSIS — E11.9 TYPE 2 DIABETES MELLITUS WITHOUT COMPLICATION, WITHOUT LONG-TERM CURRENT USE OF INSULIN (H): ICD-10-CM

## 2025-07-21 RX ORDER — ROSUVASTATIN CALCIUM 20 MG/1
20 TABLET, COATED ORAL DAILY
Qty: 30 TABLET | Refills: 0 | Status: SHIPPED | OUTPATIENT
Start: 2025-07-21

## 2025-08-23 ENCOUNTER — HEALTH MAINTENANCE LETTER (OUTPATIENT)
Age: 40
End: 2025-08-23

## (undated) RX ORDER — LIDOCAINE HYDROCHLORIDE 10 MG/ML
INJECTION, SOLUTION EPIDURAL; INFILTRATION; INTRACAUDAL; PERINEURAL
Status: DISPENSED
Start: 2021-09-20

## (undated) RX ORDER — LIDOCAINE HYDROCHLORIDE 10 MG/ML
INJECTION, SOLUTION EPIDURAL; INFILTRATION; INTRACAUDAL; PERINEURAL
Status: DISPENSED
Start: 2021-09-16